# Patient Record
Sex: MALE | Race: WHITE | NOT HISPANIC OR LATINO | Employment: FULL TIME | URBAN - NONMETROPOLITAN AREA
[De-identification: names, ages, dates, MRNs, and addresses within clinical notes are randomized per-mention and may not be internally consistent; named-entity substitution may affect disease eponyms.]

---

## 2024-06-04 ENCOUNTER — OFFICE VISIT (OUTPATIENT)
Dept: FAMILY MEDICINE CLINIC | Age: 54
End: 2024-06-04
Payer: COMMERCIAL

## 2024-06-04 VITALS
DIASTOLIC BLOOD PRESSURE: 72 MMHG | BODY MASS INDEX: 29.93 KG/M2 | HEART RATE: 86 BPM | HEIGHT: 74 IN | SYSTOLIC BLOOD PRESSURE: 120 MMHG | WEIGHT: 233.2 LBS | TEMPERATURE: 97.8 F

## 2024-06-04 DIAGNOSIS — Z76.89 ENCOUNTER TO ESTABLISH CARE: Primary | ICD-10-CM

## 2024-06-04 DIAGNOSIS — M54.50 CHRONIC LOW BACK PAIN, UNSPECIFIED BACK PAIN LATERALITY, UNSPECIFIED WHETHER SCIATICA PRESENT: ICD-10-CM

## 2024-06-04 DIAGNOSIS — Z11.59 NEED FOR HEPATITIS C SCREENING TEST: ICD-10-CM

## 2024-06-04 DIAGNOSIS — E11.9 TYPE 2 DIABETES MELLITUS WITHOUT COMPLICATION, WITHOUT LONG-TERM CURRENT USE OF INSULIN: ICD-10-CM

## 2024-06-04 DIAGNOSIS — N52.9 ERECTILE DYSFUNCTION, UNSPECIFIED ERECTILE DYSFUNCTION TYPE: ICD-10-CM

## 2024-06-04 DIAGNOSIS — I10 PRIMARY HYPERTENSION: ICD-10-CM

## 2024-06-04 DIAGNOSIS — G89.29 CHRONIC LOW BACK PAIN, UNSPECIFIED BACK PAIN LATERALITY, UNSPECIFIED WHETHER SCIATICA PRESENT: ICD-10-CM

## 2024-06-04 DIAGNOSIS — Z12.11 COLON CANCER SCREENING: ICD-10-CM

## 2024-06-04 DIAGNOSIS — K21.9 GASTROESOPHAGEAL REFLUX DISEASE, UNSPECIFIED WHETHER ESOPHAGITIS PRESENT: ICD-10-CM

## 2024-06-04 PROCEDURE — 99204 OFFICE O/P NEW MOD 45 MIN: CPT | Performed by: NURSE PRACTITIONER

## 2024-06-04 RX ORDER — MELOXICAM 15 MG/1
15 TABLET ORAL DAILY
COMMUNITY
End: 2024-06-06 | Stop reason: SDUPTHER

## 2024-06-04 RX ORDER — CHLORAL HYDRATE 500 MG
1000 CAPSULE ORAL
COMMUNITY

## 2024-06-04 RX ORDER — METHOCARBAMOL 500 MG/1
500 TABLET, FILM COATED ORAL NIGHTLY
COMMUNITY
End: 2024-06-06 | Stop reason: SDUPTHER

## 2024-06-04 RX ORDER — ALOGLIPTIN 25 MG/1
1 TABLET, FILM COATED ORAL DAILY
COMMUNITY
End: 2024-06-07

## 2024-06-04 RX ORDER — OMEPRAZOLE 20 MG/1
20 CAPSULE, DELAYED RELEASE ORAL DAILY
COMMUNITY
End: 2024-06-06 | Stop reason: SDUPTHER

## 2024-06-04 RX ORDER — CETIRIZINE HYDROCHLORIDE 10 MG/1
10 TABLET ORAL DAILY
COMMUNITY

## 2024-06-04 RX ORDER — SILDENAFIL 25 MG/1
25 TABLET, FILM COATED ORAL AS NEEDED
COMMUNITY
End: 2024-06-04

## 2024-06-04 RX ORDER — LISINOPRIL 20 MG/1
20 TABLET ORAL DAILY
COMMUNITY
End: 2024-06-06 | Stop reason: SDUPTHER

## 2024-06-04 RX ORDER — MELOXICAM 7.5 MG/1
7.5 TABLET ORAL DAILY
COMMUNITY
End: 2024-06-04

## 2024-06-04 RX ORDER — FLUTICASONE PROPIONATE 50 MCG
2 SPRAY, SUSPENSION (ML) NASAL DAILY
COMMUNITY

## 2024-06-04 RX ORDER — SILDENAFIL 100 MG/1
100 TABLET, FILM COATED ORAL DAILY PRN
COMMUNITY
End: 2024-06-06 | Stop reason: SDUPTHER

## 2024-06-04 NOTE — ASSESSMENT & PLAN NOTE
He is currently taking meloxicam 15 mg daily and Robaxin 500 mg nightly.  Will check a kidney function.

## 2024-06-04 NOTE — ASSESSMENT & PLAN NOTE
He reports that his blood sugars have been running high in the morning.  I do recommend an evening snack, such as peanut butter crackers to see if that will help.  He is currently taking metformin at 1000 mg twice daily and alogliptin 25 mg daily.  He was unable to tolerate sulfonylurea due to hypoglycemia.

## 2024-06-04 NOTE — ASSESSMENT & PLAN NOTE
Hypertension is stable and controlled  Continue current treatment regimen.  Dietary sodium restriction.  Weight loss.  Regular aerobic exercise.  Blood pressure will be reassessed in 6 months.    Continue lisinopril 20 mg daily.

## 2024-06-04 NOTE — PROGRESS NOTES
"Chief Complaint  Establish Care    Subjective          Leo Sanchez presents to [facility]  History of Present Illness  He is here to establish care. He has moved from [facility].     DM2: He is taking metformin at 1000 mg twice daily and alogliptin 25 mg daily. He reports that his BG in the morning have been running 200s. He was on gliperide, but it was hypoglycemia with it and had to stop. His last A1c was checked in November and it was 6.7. He was on a statin in the past and he would have myalgia. He is taking fish oil and metamucil.  He has been losing weight and trying to watch his sugar intake.  He reports that his morning blood sugars have been elevated.    HTN: He is taking lisinopril 20 mg daily. BP controlled. He denies SI/HI.    Low back pain: He is taking meloxicam 15 mg daily and Robaxin 500 mg nightly. He had a L4-5 discectomy 2019.    GERD: He is taking omeprazole 20 mg daily. He denies melena, dysphagia, and hematemesis.    ED: He is taking sildenafil 100 mg prn.    He's . His wife is undergoing BCA treatment.  He has 1 child.  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      Objective   Vital Signs:   /72 (BP Location: Left arm, Patient Position: Sitting, Cuff Size: Large Adult)   Pulse 86   Temp 97.8 °F (36.6 °C) (Oral)   Ht 188 cm (74\")   Wt 106 kg (233 lb 3.2 oz)   BMI 29.94 kg/m²     Physical Exam  Constitutional:       General: He is not in acute distress.     Appearance: Normal appearance. He is normal weight.   HENT:      Head: Normocephalic.   Eyes:      Pupils: Pupils are equal, round, and reactive to light.      Visual Fields: Right eye visual fields normal and left eye visual fields normal.   Neck:      Trachea: Trachea normal.   Cardiovascular:      Rate and Rhythm: Normal rate and regular rhythm.      Heart sounds: Normal heart sounds.   Pulmonary:      Effort: Pulmonary effort is normal.      Breath sounds: Normal breath sounds and air " entry.   Musculoskeletal:      Right lower leg: No edema.      Left lower leg: No edema.   Skin:     General: Skin is warm and dry.   Neurological:      Mental Status: He is alert and oriented to person, place, and time.   Psychiatric:         Mood and Affect: Mood and affect normal.         Behavior: Behavior normal.         Thought Content: Thought content normal.        Result Review :   The following data was reviewed by: MAKENNA Jung on 06/04/2024:                  Assessment and Plan    Diagnoses and all orders for this visit:    1. Encounter to establish care (Primary)    2. Type 2 diabetes mellitus without complication, without long-term current use of insulin  Assessment & Plan:  He reports that his blood sugars have been running high in the morning.  I do recommend an evening snack, such as peanut butter crackers to see if that will help.  He is currently taking metformin at 1000 mg twice daily and alogliptin 25 mg daily.  He was unable to tolerate sulfonylurea due to hypoglycemia.    Orders:  -     CBC (No Diff); Future  -     Comprehensive Metabolic Panel; Future  -     Lipid Panel; Future  -     Hemoglobin A1c; Future    3. Need for hepatitis C screening test  -     Hepatitis C Antibody; Future    4. Colon cancer screening  -     Cologuard - Stool, Per Rectum; Future    5. Primary hypertension  Assessment & Plan:  Hypertension is stable and controlled  Continue current treatment regimen.  Dietary sodium restriction.  Weight loss.  Regular aerobic exercise.  Blood pressure will be reassessed in 6 months.    Continue lisinopril 20 mg daily.      6. Chronic low back pain, unspecified back pain laterality, unspecified whether sciatica present  Assessment & Plan:  He is currently taking meloxicam 15 mg daily and Robaxin 500 mg nightly.  Will check a kidney function.      7. Erectile dysfunction, unspecified erectile dysfunction type  Assessment & Plan:  Will take Viagra 100 mg as needed      8.  Gastroesophageal reflux disease, unspecified whether esophagitis present  Assessment & Plan:  He is currently taking omeprazole 20 mg daily.            Follow Up   Return in about 6 months (around 12/4/2024) for Annual physical.  Patient was given instructions and counseling regarding his condition or for health maintenance advice. Please see specific information pulled into the AVS if appropriate.

## 2024-06-05 ENCOUNTER — LAB (OUTPATIENT)
Dept: LAB | Facility: HOSPITAL | Age: 54
End: 2024-06-05
Payer: COMMERCIAL

## 2024-06-05 DIAGNOSIS — Z11.59 NEED FOR HEPATITIS C SCREENING TEST: ICD-10-CM

## 2024-06-05 DIAGNOSIS — E11.9 TYPE 2 DIABETES MELLITUS WITHOUT COMPLICATION, WITHOUT LONG-TERM CURRENT USE OF INSULIN: ICD-10-CM

## 2024-06-05 LAB
ALBUMIN SERPL-MCNC: 4.6 G/DL (ref 3.5–5.2)
ALBUMIN/GLOB SERPL: 1.8 G/DL
ALP SERPL-CCNC: 85 U/L (ref 39–117)
ALT SERPL W P-5'-P-CCNC: 100 U/L (ref 1–41)
ANION GAP SERPL CALCULATED.3IONS-SCNC: 12 MMOL/L (ref 5–15)
AST SERPL-CCNC: 57 U/L (ref 1–40)
BILIRUB SERPL-MCNC: 1.3 MG/DL (ref 0–1.2)
BUN SERPL-MCNC: 17 MG/DL (ref 6–20)
BUN/CREAT SERPL: 19.3 (ref 7–25)
CALCIUM SPEC-SCNC: 9.3 MG/DL (ref 8.6–10.5)
CHLORIDE SERPL-SCNC: 100 MMOL/L (ref 98–107)
CHOLEST SERPL-MCNC: 199 MG/DL (ref 0–200)
CO2 SERPL-SCNC: 25 MMOL/L (ref 22–29)
CREAT SERPL-MCNC: 0.88 MG/DL (ref 0.76–1.27)
DEPRECATED RDW RBC AUTO: 45.4 FL (ref 37–54)
EGFRCR SERPLBLD CKD-EPI 2021: 101.6 ML/MIN/1.73
ERYTHROCYTE [DISTWIDTH] IN BLOOD BY AUTOMATED COUNT: 13.1 % (ref 12.3–15.4)
GLOBULIN UR ELPH-MCNC: 2.6 GM/DL
GLUCOSE SERPL-MCNC: 194 MG/DL (ref 65–99)
HBA1C MFR BLD: 8.3 % (ref 4.8–5.6)
HCT VFR BLD AUTO: 44.5 % (ref 37.5–51)
HCV AB SER QL: NORMAL
HDLC SERPL-MCNC: 33 MG/DL (ref 40–60)
HGB BLD-MCNC: 15.6 G/DL (ref 13–17.7)
LDLC SERPL CALC-MCNC: 87 MG/DL (ref 0–100)
LDLC/HDLC SERPL: 2.08 {RATIO}
MCH RBC QN AUTO: 32.9 PG (ref 26.6–33)
MCHC RBC AUTO-ENTMCNC: 35.1 G/DL (ref 31.5–35.7)
MCV RBC AUTO: 93.9 FL (ref 79–97)
PLATELET # BLD AUTO: 241 10*3/MM3 (ref 140–450)
PMV BLD AUTO: 11.4 FL (ref 6–12)
POTASSIUM SERPL-SCNC: 4.6 MMOL/L (ref 3.5–5.2)
PROT SERPL-MCNC: 7.2 G/DL (ref 6–8.5)
RBC # BLD AUTO: 4.74 10*6/MM3 (ref 4.14–5.8)
SODIUM SERPL-SCNC: 137 MMOL/L (ref 136–145)
TRIGL SERPL-MCNC: 487 MG/DL (ref 0–150)
VLDLC SERPL-MCNC: 79 MG/DL (ref 5–40)
WBC NRBC COR # BLD AUTO: 6.8 10*3/MM3 (ref 3.4–10.8)

## 2024-06-05 PROCEDURE — 86803 HEPATITIS C AB TEST: CPT

## 2024-06-05 PROCEDURE — 85027 COMPLETE CBC AUTOMATED: CPT

## 2024-06-05 PROCEDURE — 80061 LIPID PANEL: CPT

## 2024-06-05 PROCEDURE — 83036 HEMOGLOBIN GLYCOSYLATED A1C: CPT

## 2024-06-05 PROCEDURE — 36415 COLL VENOUS BLD VENIPUNCTURE: CPT

## 2024-06-05 PROCEDURE — 80053 COMPREHEN METABOLIC PANEL: CPT

## 2024-06-06 DIAGNOSIS — K21.9 GASTROESOPHAGEAL REFLUX DISEASE, UNSPECIFIED WHETHER ESOPHAGITIS PRESENT: ICD-10-CM

## 2024-06-06 DIAGNOSIS — N52.9 ERECTILE DYSFUNCTION, UNSPECIFIED ERECTILE DYSFUNCTION TYPE: ICD-10-CM

## 2024-06-06 DIAGNOSIS — E11.65 TYPE 2 DIABETES MELLITUS WITH HYPERGLYCEMIA, WITHOUT LONG-TERM CURRENT USE OF INSULIN: Primary | ICD-10-CM

## 2024-06-06 DIAGNOSIS — M54.50 CHRONIC LOW BACK PAIN, UNSPECIFIED BACK PAIN LATERALITY, UNSPECIFIED WHETHER SCIATICA PRESENT: ICD-10-CM

## 2024-06-06 DIAGNOSIS — G89.29 CHRONIC LOW BACK PAIN, UNSPECIFIED BACK PAIN LATERALITY, UNSPECIFIED WHETHER SCIATICA PRESENT: ICD-10-CM

## 2024-06-06 DIAGNOSIS — I10 PRIMARY HYPERTENSION: ICD-10-CM

## 2024-06-07 ENCOUNTER — PATIENT ROUNDING (BHMG ONLY) (OUTPATIENT)
Dept: FAMILY MEDICINE CLINIC | Age: 54
End: 2024-06-07
Payer: COMMERCIAL

## 2024-06-07 DIAGNOSIS — R79.89 ELEVATED LFTS: Primary | ICD-10-CM

## 2024-06-07 RX ORDER — MELOXICAM 15 MG/1
15 TABLET ORAL DAILY
Qty: 90 TABLET | Refills: 1 | Status: SHIPPED | OUTPATIENT
Start: 2024-06-07

## 2024-06-07 RX ORDER — ALOGLIPTIN 25 MG/1
25 TABLET, FILM COATED ORAL DAILY
Qty: 30 TABLET | Status: CANCELLED | OUTPATIENT
Start: 2024-06-07

## 2024-06-07 RX ORDER — SILDENAFIL 100 MG/1
100 TABLET, FILM COATED ORAL DAILY PRN
Qty: 30 TABLET | Refills: 5 | Status: SHIPPED | OUTPATIENT
Start: 2024-06-07

## 2024-06-07 RX ORDER — OMEPRAZOLE 20 MG/1
20 CAPSULE, DELAYED RELEASE ORAL DAILY
Qty: 90 CAPSULE | Refills: 1 | Status: SHIPPED | OUTPATIENT
Start: 2024-06-07

## 2024-06-07 RX ORDER — LISINOPRIL 20 MG/1
20 TABLET ORAL DAILY
Qty: 90 TABLET | Refills: 1 | Status: SHIPPED | OUTPATIENT
Start: 2024-06-07

## 2024-06-07 RX ORDER — METHOCARBAMOL 500 MG/1
500 TABLET, FILM COATED ORAL NIGHTLY
Qty: 90 TABLET | Refills: 1 | Status: SHIPPED | OUTPATIENT
Start: 2024-06-07

## 2024-06-07 NOTE — TELEPHONE ENCOUNTER
Rx Refill Note  Requested Prescriptions     Pending Prescriptions Disp Refills    metFORMIN (GLUCOPHAGE) 1000 MG tablet       Sig: Take 1 tablet by mouth 2 (Two) Times a Day With Meals.    Alogliptin Benzoate 25 MG tablet 30 tablet      Sig: Take 1 tablet by mouth Daily.    lisinopril (PRINIVIL,ZESTRIL) 20 MG tablet       Sig: Take 1 tablet by mouth Daily.    omeprazole (priLOSEC) 20 MG capsule       Sig: Take 1 capsule by mouth Daily.    methocarbamol (ROBAXIN) 500 MG tablet       Sig: Take 1 tablet by mouth Every Night.    sildenafil (VIAGRA) 100 MG tablet       Sig: Take 1 tablet by mouth Daily As Needed for Erectile Dysfunction.    meloxicam (MOBIC) 15 MG tablet       Sig: Take 1 tablet by mouth Daily.      Last office visit with prescribing clinician: 6/4/2024   Last telemedicine visit with prescribing clinician: Visit date not found   Next office visit with prescribing clinician: 12/4/2024       Pinky Agrawal LPN  06/07/24, 08:57 EDT    NOT FILLED BY YOU PREVIOUSLY, PLEASE ADV.

## 2024-06-07 NOTE — PROGRESS NOTES
June 7, 2024    Hello, may I speak with Leo Sanchez?    My name is AJ     I am  with [facility]  [address] [facility] [address]  [place] [id number] [phone].    Before we get started may I verify your date of birth? 1970    I am calling to officially welcome you to our practice and ask about your recent visit. Is this a good time to talk? YES    Tell me about your visit with us. What things went well?  Everything was good. Very professional. Clarissa answered my questions. Good physician.       We're always looking for ways to make our patients' experiences even better. Do you have recommendations on ways we may improve?  NO    Overall were you satisfied with your first visit to our practice? YES       I appreciate you taking the time to speak with me today. Is there anything else I can do for you? NO      Thank you, and have a great day.

## 2024-06-07 NOTE — TELEPHONE ENCOUNTER
I would like to switch his alogliptin to Ozempic or Trulicity, as I think he would have a greater improvement in his A1c.  Please let me know if he is agreeable.

## 2024-06-10 ENCOUNTER — PRIOR AUTHORIZATION (OUTPATIENT)
Dept: FAMILY MEDICINE CLINIC | Age: 54
End: 2024-06-10
Payer: COMMERCIAL

## 2024-07-16 ENCOUNTER — TELEPHONE (OUTPATIENT)
Dept: GASTROENTEROLOGY | Facility: CLINIC | Age: 54
End: 2024-07-16
Payer: COMMERCIAL

## 2024-07-17 DIAGNOSIS — E11.65 TYPE 2 DIABETES MELLITUS WITH HYPERGLYCEMIA, WITHOUT LONG-TERM CURRENT USE OF INSULIN: Primary | ICD-10-CM

## 2024-07-22 DIAGNOSIS — E11.65 TYPE 2 DIABETES MELLITUS WITH HYPERGLYCEMIA, WITHOUT LONG-TERM CURRENT USE OF INSULIN: ICD-10-CM

## 2024-07-22 RX ORDER — SEMAGLUTIDE 0.68 MG/ML
0.5 INJECTION, SOLUTION SUBCUTANEOUS WEEKLY
Qty: 3 ML | Refills: 5 | Status: SHIPPED | OUTPATIENT
Start: 2024-07-22

## 2024-07-25 NOTE — TELEPHONE ENCOUNTER
Attempted to contact the patient in regards to the new patient appointment he has scheduled for 09/30/24 with Harriett Land at the [place] location to offer a sooner appointment with our new APRN Jackie Saunders since she has sooner availability for [place]. The patient did not answer so I left a voicemail requesting a call back.

## 2024-07-30 NOTE — TELEPHONE ENCOUNTER
Attempted to contact the patient for the third time in regards to the new patient appointment he has scheduled for 09/30/24 with Harriett Land at the [place] location to offer a sooner appointment with our new APRN Jackie Saunders since she has sooner availability for [place]. The patient did not answer so I left a voicemail requesting a call back if he would like to be moved up for a sooner appointment.    This is the third time I have attempted to call the patient to offer a sooner appointment, I have removed him from the wait list since he has not answered any of the calls or called our office back.

## 2024-07-30 NOTE — TELEPHONE ENCOUNTER
Patient called the office back and verified his information then was advised on the new APRN with the practice and what her availability is for sooner appointments in [place]. Patient verbalized understanding and has been rescheduled for 08/02/24 at 3:30 pm with MAKENNA Ng.

## 2024-08-02 ENCOUNTER — OFFICE VISIT (OUTPATIENT)
Age: 54
End: 2024-08-02
Payer: COMMERCIAL

## 2024-08-02 ENCOUNTER — LAB (OUTPATIENT)
Dept: LAB | Facility: HOSPITAL | Age: 54
End: 2024-08-02
Payer: COMMERCIAL

## 2024-08-02 VITALS
HEART RATE: 88 BPM | HEIGHT: 74 IN | WEIGHT: 224 LBS | DIASTOLIC BLOOD PRESSURE: 84 MMHG | BODY MASS INDEX: 28.75 KG/M2 | SYSTOLIC BLOOD PRESSURE: 127 MMHG | TEMPERATURE: 98.4 F | OXYGEN SATURATION: 97 %

## 2024-08-02 DIAGNOSIS — R79.89 ELEVATED LIVER FUNCTION TESTS: Primary | ICD-10-CM

## 2024-08-02 DIAGNOSIS — R17 ELEVATED BILIRUBIN: ICD-10-CM

## 2024-08-02 LAB
ALPHA-FETOPROTEIN: <2 NG/ML (ref 0–8.3)
CERULOPLASMIN SERPL-MCNC: 5 MG/DL (ref 16–31)
DEPRECATED RDW RBC AUTO: 40.1 FL (ref 37–54)
ERYTHROCYTE [DISTWIDTH] IN BLOOD BY AUTOMATED COUNT: 12 % (ref 12.3–15.4)
FERRITIN SERPL-MCNC: 112.3 NG/ML (ref 30–400)
HAV IGM SERPL QL IA: NORMAL
HBV CORE IGM SERPL QL IA: NORMAL
HBV SURFACE AG SERPL QL IA: NORMAL
HCT VFR BLD AUTO: 47.8 % (ref 37.5–51)
HCV AB SER QL: NORMAL
HGB BLD-MCNC: 17.5 G/DL (ref 13–17.7)
INR PPP: 1.06 (ref 0.86–1.15)
IRON 24H UR-MRATE: 93 MCG/DL (ref 59–158)
IRON SATN MFR SERPL: 24 % (ref 20–50)
MCH RBC QN AUTO: 33.4 PG (ref 26.6–33)
MCHC RBC AUTO-ENTMCNC: 36.6 G/DL (ref 31.5–35.7)
MCV RBC AUTO: 91.2 FL (ref 79–97)
PLATELET # BLD AUTO: 264 10*3/MM3 (ref 140–450)
PMV BLD AUTO: 11.7 FL (ref 6–12)
PROTHROMBIN TIME: 14 SECONDS (ref 11.8–14.9)
RBC # BLD AUTO: 5.24 10*6/MM3 (ref 4.14–5.8)
TIBC SERPL-MCNC: 395 MCG/DL (ref 298–536)
TRANSFERRIN SERPL-MCNC: 265 MG/DL (ref 200–360)
WBC NRBC COR # BLD AUTO: 7.98 10*3/MM3 (ref 3.4–10.8)

## 2024-08-02 PROCEDURE — 82390 ASSAY OF CERULOPLASMIN: CPT

## 2024-08-02 PROCEDURE — 86038 ANTINUCLEAR ANTIBODIES: CPT

## 2024-08-02 PROCEDURE — 86334 IMMUNOFIX E-PHORESIS SERUM: CPT

## 2024-08-02 PROCEDURE — 36415 COLL VENOUS BLD VENIPUNCTURE: CPT

## 2024-08-02 PROCEDURE — 84165 PROTEIN E-PHORESIS SERUM: CPT

## 2024-08-02 PROCEDURE — 82525 ASSAY OF COPPER: CPT

## 2024-08-02 PROCEDURE — 84466 ASSAY OF TRANSFERRIN: CPT

## 2024-08-02 PROCEDURE — 85027 COMPLETE CBC AUTOMATED: CPT

## 2024-08-02 PROCEDURE — 82728 ASSAY OF FERRITIN: CPT

## 2024-08-02 PROCEDURE — 86381 MITOCHONDRIAL ANTIBODY EACH: CPT

## 2024-08-02 PROCEDURE — 82784 ASSAY IGA/IGD/IGG/IGM EACH: CPT

## 2024-08-02 PROCEDURE — 82105 ALPHA-FETOPROTEIN SERUM: CPT

## 2024-08-02 PROCEDURE — 85610 PROTHROMBIN TIME: CPT

## 2024-08-02 PROCEDURE — 80076 HEPATIC FUNCTION PANEL: CPT

## 2024-08-02 PROCEDURE — 82104 ALPHA-1-ANTITRYPSIN PHENO: CPT

## 2024-08-02 PROCEDURE — 82103 ALPHA-1-ANTITRYPSIN TOTAL: CPT

## 2024-08-02 PROCEDURE — 80074 ACUTE HEPATITIS PANEL: CPT

## 2024-08-02 PROCEDURE — 84155 ASSAY OF PROTEIN SERUM: CPT

## 2024-08-02 PROCEDURE — 86015 ACTIN ANTIBODY EACH: CPT

## 2024-08-02 PROCEDURE — 83540 ASSAY OF IRON: CPT

## 2024-08-02 NOTE — PROGRESS NOTES
Chief Complaint     elevated LFT    History of Present Illness     Leo Sanchez is a 53 y.o. male who presents to [facility] on referral from Kristie Kerr for a gastroenterology evaluation of elevated liver function tests.      Patient states the first time he experienced elevated liver function tests was when he tried to take statins for hyperlipidemia. He recently had started to take statins once again but experienced severe body cramping. Patient states during this time his creatinine kinase was greater than 5,000. He explains once he discontinues the statins his LFTs will usually improve. Since he cannot take statins, he started taking fish oils.     Patient has a long history of Back problems and back pain for which he takes meloxicam everyday. He notes that since March 1st, he has lost 20 lbs, through diet and the start of Ozempic. He reports elevated blood sugar levels, for which his PCP started him on Jardiance, one week ago. He denies nausea, vomiting, or dysphagia. States his heartburn is controlled with omeprazole. Reports occasional use of alcohol, one beer, twice a month.    Denies RUQ pain, IV drug use, unprofessional tattoos, history of or exposure to hepatitis, history of blood transfusions, and family history of liver disease.     Patient has a denies family history of colon cancer or colon polyps. No history of EGD/Colonoscopy, but Cologuard July 2024 negative.     History      Past Medical History:   Diagnosis Date    Diabetes mellitus 11/28/2012    Erectile dysfunction 2020    Fatty liver     HL (hearing loss) 2020    Hyperlipidemia     Hypertension     Low back pain        Past Surgical History:   Procedure Laterality Date    LUMBAR DISCECTOMY      10/2019    SHOULDER ARTHROSCOPY W/ LABRAL REPAIR Left 08/29/2023    VASECTOMY  2000       Family History   Problem Relation Age of Onset    Stroke Mother     Diabetes Father     Prostate cancer Father   "   Prostate cancer Brother     Diabetes Brother     Colon cancer Neg Hx         Current Medications        Current Outpatient Medications:     cetirizine (zyrTEC) 10 MG tablet, Take 1 tablet by mouth Daily., Disp: , Rfl:     fluticasone (FLONASE) 50 MCG/ACT nasal spray, 2 sprays into the nostril(s) as directed by provider Daily., Disp: , Rfl:     lisinopril (PRINIVIL,ZESTRIL) 20 MG tablet, Take 1 tablet by mouth Daily., Disp: 90 tablet, Rfl: 1    meloxicam (MOBIC) 15 MG tablet, Take 1 tablet by mouth Daily., Disp: 90 tablet, Rfl: 1    metFORMIN (GLUCOPHAGE) 1000 MG tablet, Take 1 tablet by mouth 2 (Two) Times a Day With Meals., Disp: 180 tablet, Rfl: 1    methocarbamol (ROBAXIN) 500 MG tablet, Take 1 tablet by mouth Every Night., Disp: 90 tablet, Rfl: 1    Omega-3 Fatty Acids (fish oil) 1000 MG capsule capsule, Take 1 capsule by mouth Daily With Breakfast., Disp: , Rfl:     omeprazole (priLOSEC) 20 MG capsule, Take 1 capsule by mouth Daily., Disp: 90 capsule, Rfl: 1    Semaglutide,0.25 or 0.5MG/DOS, (Ozempic, 0.25 or 0.5 MG/DOSE,) 2 MG/3ML solution pen-injector, Inject 0.5 mg under the skin into the appropriate area as directed 1 (One) Time Per Week., Disp: 3 mL, Rfl: 5    sildenafil (VIAGRA) 100 MG tablet, Take 1 tablet by mouth Daily As Needed for Erectile Dysfunction., Disp: 30 tablet, Rfl: 5     Allergies     Allergies   Allergen Reactions    Statins Other (See Comments)     Muscle cramps       Social History       Social History     Social History Narrative    , 1 child. Probation and parole       Immunizations     Immunization:  Immunization History   Administered Date(s) Administered    Fluzone (or Fluarix & Flulaval for VFC) >6mos 01/05/2024    Tdap 01/05/2024          Objective     Objective     Vital Signs:   /84   Pulse 88   Temp 98.4 °F (36.9 °C) (Oral)   Ht 188 cm (74\")   Wt 102 kg (224 lb)   SpO2 97%   BMI 28.76 kg/m²       Physical Exam  HENT:      Head: Normocephalic.   Pulmonary: " "     Effort: Pulmonary effort is normal.   Abdominal:      General: Abdomen is flat. Bowel sounds are normal.      Palpations: Abdomen is soft.   Skin:     General: Skin is warm and dry.   Neurological:      General: No focal deficit present.      Mental Status: He is alert.   Psychiatric:         Mood and Affect: Mood normal.         Results      Result Review :   The following data was reviewed by: MAKENNA Ng on 08/02/2024:    Lab Results - Last 18 Months   Lab Units 06/05/24  0716   WBC 10*3/mm3 6.80   HEMOGLOBIN g/dL 15.6   MCV fL 93.9   PLATELETS 10*3/mm3 241         Lab Results - Last 18 Months   Lab Units 06/05/24  0716   BUN mg/dL 17   CREATININE mg/dL 0.88   SODIUM mmol/L 137   POTASSIUM mmol/L 4.6   CHLORIDE mmol/L 100   CO2 mmol/L 25.0   GLUCOSE mg/dL 194*      No results for input(s): \"PROTIME\", \"INR\", \"PTT\" in the last 26923 hours.  Lab Results - Last 18 Months   Lab Units 06/05/24  0716   AST (SGOT) U/L 57*   ALT (SGPT) U/L 100*   ALK PHOS U/L 85   BILIRUBIN mg/dL 1.3*   TOTAL PROTEIN g/dL 7.2   ALBUMIN g/dL 4.6      No results for input(s): \"IRON\", \"TRANSFERRIN\", \"TIBC\", \"FERRITIN\", \"SOSKVOZD53\", \"FOLATE\" in the last 60359 hours.  No results for input(s): \"HAV\", \"HEPAIGM\", \"HEPBIGM\", \"HEPBCAB\", \"HBEAG\", \"HEPCAB\" in the last 31717 hours.    No Images in the past 120 days found..    [facility]        Assessment and Plan        Assessment and Plan    Diagnoses and all orders for this visit:    1. Elevated liver function tests (Primary)  -     AFP Tumor Marker  -     Alpha - 1 - Antitrypsin Phenotype  -     ADELIA  -     Hepatic Function Panel  -     Hepatitis Panel, Acute  -     Iron Profile  -     Ferritin  -     Copper, Serum  -     Protime-INR  -     Ceruloplasmin  -     Mitochondrial Antibodies, M2  -     Anti-Smooth Muscle Antibody Titer  -     PRIYA + PE  -     CBC (No Diff)    2. Elevated bilirubin  -     AFP Tumor Marker  -     Alpha - 1 - Antitrypsin " Phenotype  -     ADELIA  -     Hepatic Function Panel  -     Hepatitis Panel, Acute  -     Iron Profile  -     Ferritin  -     Copper, Serum  -     Protime-INR  -     Ceruloplasmin  -     Mitochondrial Antibodies, M2  -     Anti-Smooth Muscle Antibody Titer  -     PRIYA + PE  -     CBC (No Diff)        Follow Up        Follow Up   Return in about 6 months (around 2/2/2025).    Hepatic workup and liver ultrasound  Advise patient to maintain a healthy lifestyle: weight loss of 10%, cutting back on carbs, sugars, and fried fatty foods, limiting intake of soda and sugary drinks, and abstain from alcohol.  Recommend to drink 2-4 cups of coffee.     Patient was given instructions and counseling regarding his condition or for health maintenance advice. Please see specific information pulled into the AVS if appropriate.

## 2024-08-02 NOTE — PATIENT INSTRUCTIONS
Advise patient to maintain a healthy lifestyle: weight loss of 10%, cutting back on carbs, sugars, and fried fatty foods, limiting intake of soda and sugary drinks, and abstain from alcohol.  Recommend to drink 2-4 cups of coffee.

## 2024-08-03 LAB
ALBUMIN SERPL-MCNC: 4.6 G/DL (ref 3.5–5.2)
ALP SERPL-CCNC: 114 U/L (ref 39–117)
ALT SERPL W P-5'-P-CCNC: 77 U/L (ref 1–41)
AST SERPL-CCNC: 47 U/L (ref 1–40)
BILIRUB CONJ SERPL-MCNC: <0.2 MG/DL (ref 0–0.3)
BILIRUB INDIRECT SERPL-MCNC: ABNORMAL MG/DL
BILIRUB SERPL-MCNC: 0.5 MG/DL (ref 0–1.2)
PROT SERPL-MCNC: 7.3 G/DL (ref 6–8.5)

## 2024-08-05 LAB
ALBUMIN SERPL ELPH-MCNC: 4.1 G/DL (ref 2.9–4.4)
ALBUMIN/GLOB SERPL: 1.4 {RATIO} (ref 0.7–1.7)
ALPHA1 GLOB SERPL ELPH-MCNC: 0.1 G/DL (ref 0–0.4)
ALPHA2 GLOB SERPL ELPH-MCNC: 1 G/DL (ref 0.4–1)
ANA SER QL: NEGATIVE
B-GLOBULIN SERPL ELPH-MCNC: 1.2 G/DL (ref 0.7–1.3)
GAMMA GLOB SERPL ELPH-MCNC: 0.7 G/DL (ref 0.4–1.8)
GLOBULIN SER-MCNC: 3.1 G/DL (ref 2.2–3.9)
IGA SERPL-MCNC: 346 MG/DL (ref 90–386)
IGG SERPL-MCNC: 825 MG/DL (ref 603–1613)
IGM SERPL-MCNC: 48 MG/DL (ref 20–172)
INTERPRETATION SERPL IEP-IMP: NORMAL
LABORATORY COMMENT REPORT: NORMAL
M PROTEIN SERPL ELPH-MCNC: NORMAL G/DL
MITOCHONDRIA M2 IGG SER-ACNC: <20 UNITS (ref 0–20)
PROT SERPL-MCNC: 7.2 G/DL (ref 6–8.5)
SMA IGG SER-ACNC: 5 UNITS (ref 0–19)

## 2024-08-06 LAB — COPPER SERPL-MCNC: 77 UG/DL (ref 69–132)

## 2024-08-07 LAB
A1AT PHENOTYP SERPL IFE: ABNORMAL
A1AT SERPL-MCNC: 91 MG/DL (ref 101–187)

## 2024-08-08 DIAGNOSIS — E88.01 ALPHA-1-ANTITRYPSIN DEFICIENCY: Primary | ICD-10-CM

## 2024-08-09 ENCOUNTER — TELEPHONE (OUTPATIENT)
Dept: GASTROENTEROLOGY | Facility: CLINIC | Age: 54
End: 2024-08-09
Payer: COMMERCIAL

## 2024-08-09 NOTE — TELEPHONE ENCOUNTER
----- Message from Jackie Saunders sent at 8/8/2024  9:01 PM EDT -----  Alpha 1 antitrypsin levels are low. This can be indicative of alpha 1 antitrypsin deficiency. An inheritied disorder that may cause tissue damage in the lungs, liver, and skin. Adults with alpha-1 antitrypsin deficiency develop liver damage due to formation of scar tissue in the liver.  Placed orders for pulmonary consultation to verify diagnosis and course of treatment.     The ALT and AST remain elevated but trending down. On the CBC MCH, MCHC, and RDW all slightly elevated at 33.4, 36.6, and 12 respectively.      The rest of the Hepatic workup is within normal limits.

## 2024-08-09 NOTE — TELEPHONE ENCOUNTER
Spoke to patient and informed of MAKENNA Ng result note and recommendations. Verified patient understanding.    Patient is agreeable to pulmonology referral.  Advised patient to expect a call from them for scheduling.    [facility] states [facility] and [facility] Liver results.  States that once received he will scan into [facility] [employer].    Confirmed follow up appointment with Jackie Saunders APR on 02.07.25 at 1500.    Patient does not report any GI issues or concerns at this time. Educated to contact the office if any arise.

## 2024-08-19 ENCOUNTER — TELEPHONE (OUTPATIENT)
Dept: GASTROENTEROLOGY | Facility: CLINIC | Age: 54
End: 2024-08-19
Payer: COMMERCIAL

## 2024-08-20 NOTE — TELEPHONE ENCOUNTER
"----- Message from Elsa AGUIRRE sent at 8/19/2024  8:23 AM EDT -----  Regarding: FW: Liver Ultrasound  Contact: [phone]    ----- Message -----  From: Leo Sanchez \"Pat\"  Sent: 8/19/2024   6:37 AM EDT  To: [facility] [place] Clinical Pool  Subject: Liver Ultrasound                                 [facility].  "

## 2024-08-26 NOTE — PROGRESS NOTES
"Chief Complaint  Blood Sugar Problem (Pt states that in April/May his sugar numbers were running in the 300's./Pt blood sugar was 127 this morning./)    Subjective          Leo Sanchez presents to [facility]  History of Present Illness  He returns for follow-up.    DM2: His blood sugars have been running high at home.  He was taking off his alogliptin and placed on Ozempic.  He is unable to take sulfonylureas. He was [facility] on Jardiance [facility], which has helped with his BG.  He does have an appointment with the diabetic nurse practitioner next month. He has an appt for a diabetic eye exam in October with Dr. Fernandes.     HTN: He is taking lisinopril 20 mg daily. BP controlled.      Elevated LFTs: He was referred to gastro and found that he may have an alpha 1 antitrypsin deficiency.  He has been referred to pulmonology.      Objective   Vital Signs:   /75 (BP Location: Left arm, Patient Position: Sitting)   Pulse 81   Ht 188 cm (74\")   Wt 101 kg (223 lb 3.2 oz)   BMI 28.66 kg/m²     Physical Exam  Constitutional:       General: He is not in acute distress.     Appearance: Normal appearance. He is normal weight.   HENT:      Head: Normocephalic.   Eyes:      Pupils: Pupils are equal, round, and reactive to light.      Visual Fields: Right eye visual fields normal and left eye visual fields normal.   Neck:      Trachea: Trachea normal.   Cardiovascular:      Rate and Rhythm: Normal rate and regular rhythm.      Pulses:           Dorsalis pedis pulses are 3+ on the right side and 3+ on the left side.        Posterior tibial pulses are 3+ on the right side and 3+ on the left side.      Heart sounds: Normal heart sounds.   Pulmonary:      Effort: Pulmonary effort is normal.      Breath sounds: Normal breath sounds and air entry.   Musculoskeletal:      Right lower leg: No edema.      Left lower leg: No edema.   Feet:      Right foot:      Protective Sensation: 6 " sites tested.  6 sites sensed.      Skin integrity: Skin integrity normal.      Toenail Condition: Right toenails are ingrown.      Left foot:      Protective Sensation: 6 sites tested.  6 sites sensed.      Skin integrity: Skin integrity normal.      Toenail Condition: Left toenails are ingrown.   Skin:     General: Skin is warm and dry.   Neurological:      Mental Status: He is alert and oriented to person, place, and time.   Psychiatric:         Mood and Affect: Mood and affect normal.         Behavior: Behavior normal.         Thought Content: Thought content normal.        Result Review :   The following data was reviewed by: MAKENNA Jung on 09/09/2024:                  Assessment and Plan    Diagnoses and all orders for this visit:    1. Type 2 diabetes mellitus without complication, without long-term current use of insulin (Primary)  Assessment & Plan:  He is currently taking Ozempic 0.5 mg once weekly, metformin 1000 mg twice daily, and Jardiance 25 mg daily.  The only side effect he is having is nausea associated with the Ozempic.  Will send in some Zofran to see if that will help.    Diabetic Foot Exam Performed     Orders:  -     CBC (No Diff); Future  -     Comprehensive Metabolic Panel; Future  -     Lipid Panel; Future  -     Hemoglobin A1c; Future  -     Microalbumin / Creatinine Urine Ratio - Urine, Clean Catch    2. Primary hypertension  Assessment & Plan:  Hypertension is stable and controlled  Continue current treatment regimen.  Dietary sodium restriction.  Weight loss.  Regular aerobic exercise.  Blood pressure will be reassessed in 6 months.    Continue lisinopril 20 mg daily.    Orders:  -     CBC (No Diff); Future  -     Comprehensive Metabolic Panel; Future  -     Lipid Panel; Future    3. Ingrown toenail of both feet  -     Ambulatory Referral to Podiatry    4. Nausea  -     ondansetron (Zofran) 4 MG tablet; Take 1 tablet by mouth Every 8 (Eight) Hours As Needed for Nausea.   Dispense: 30 tablet; Refill: 5    5. Elevated LFTs    6. Alpha-1-antitrypsin deficiency          Follow Up   Return in about 6 months (around 3/9/2025) for Annual physical.  Patient was given instructions and counseling regarding his condition or for health maintenance advice. Please see specific information pulled into the AVS if appropriate.

## 2024-09-09 ENCOUNTER — OFFICE VISIT (OUTPATIENT)
Dept: FAMILY MEDICINE CLINIC | Age: 54
End: 2024-09-09
Payer: COMMERCIAL

## 2024-09-09 VITALS
BODY MASS INDEX: 28.64 KG/M2 | SYSTOLIC BLOOD PRESSURE: 115 MMHG | HEART RATE: 81 BPM | DIASTOLIC BLOOD PRESSURE: 75 MMHG | HEIGHT: 74 IN | WEIGHT: 223.2 LBS

## 2024-09-09 DIAGNOSIS — I10 PRIMARY HYPERTENSION: ICD-10-CM

## 2024-09-09 DIAGNOSIS — E88.01 ALPHA-1-ANTITRYPSIN DEFICIENCY: ICD-10-CM

## 2024-09-09 DIAGNOSIS — R11.0 NAUSEA: ICD-10-CM

## 2024-09-09 DIAGNOSIS — L60.0 INGROWN TOENAIL OF BOTH FEET: ICD-10-CM

## 2024-09-09 DIAGNOSIS — R79.89 ELEVATED LFTS: ICD-10-CM

## 2024-09-09 DIAGNOSIS — E11.9 TYPE 2 DIABETES MELLITUS WITHOUT COMPLICATION, WITHOUT LONG-TERM CURRENT USE OF INSULIN: Primary | ICD-10-CM

## 2024-09-09 PROCEDURE — 99214 OFFICE O/P EST MOD 30 MIN: CPT | Performed by: NURSE PRACTITIONER

## 2024-09-09 RX ORDER — ONDANSETRON 4 MG/1
4 TABLET, FILM COATED ORAL EVERY 8 HOURS PRN
Qty: 30 TABLET | Refills: 5 | Status: SHIPPED | OUTPATIENT
Start: 2024-09-09

## 2024-09-09 NOTE — ASSESSMENT & PLAN NOTE
He is currently taking Ozempic 0.5 mg once weekly, metformin 1000 mg twice daily, and Jardiance 25 mg daily.  The only side effect he is having is nausea associated with the Ozempic.  Will send in some Zofran to see if that will help.    Diabetic Foot Exam Performed

## 2024-09-16 ENCOUNTER — LAB (OUTPATIENT)
Dept: LAB | Facility: HOSPITAL | Age: 54
End: 2024-09-16
Payer: COMMERCIAL

## 2024-09-16 DIAGNOSIS — I10 PRIMARY HYPERTENSION: ICD-10-CM

## 2024-09-16 DIAGNOSIS — E11.9 TYPE 2 DIABETES MELLITUS WITHOUT COMPLICATION, WITHOUT LONG-TERM CURRENT USE OF INSULIN: ICD-10-CM

## 2024-09-16 LAB
ALBUMIN SERPL-MCNC: 4.5 G/DL (ref 3.5–5.2)
ALBUMIN UR-MCNC: <1.2 MG/DL
ALBUMIN/GLOB SERPL: 1.9 G/DL
ALP SERPL-CCNC: 71 U/L (ref 39–117)
ALT SERPL W P-5'-P-CCNC: 88 U/L (ref 1–41)
ANION GAP SERPL CALCULATED.3IONS-SCNC: 9.6 MMOL/L (ref 5–15)
AST SERPL-CCNC: 42 U/L (ref 1–40)
BILIRUB SERPL-MCNC: 0.7 MG/DL (ref 0–1.2)
BUN SERPL-MCNC: 15 MG/DL (ref 6–20)
BUN/CREAT SERPL: 16.1 (ref 7–25)
CALCIUM SPEC-SCNC: 9.7 MG/DL (ref 8.6–10.5)
CHLORIDE SERPL-SCNC: 102 MMOL/L (ref 98–107)
CHOLEST SERPL-MCNC: 209 MG/DL (ref 0–200)
CO2 SERPL-SCNC: 24.4 MMOL/L (ref 22–29)
CREAT SERPL-MCNC: 0.93 MG/DL (ref 0.76–1.27)
CREAT UR-MCNC: 122.8 MG/DL
DEPRECATED RDW RBC AUTO: 44.6 FL (ref 37–54)
EGFRCR SERPLBLD CKD-EPI 2021: 96.4 ML/MIN/1.73
ERYTHROCYTE [DISTWIDTH] IN BLOOD BY AUTOMATED COUNT: 12.7 % (ref 12.3–15.4)
GLOBULIN UR ELPH-MCNC: 2.4 GM/DL
GLUCOSE SERPL-MCNC: 125 MG/DL (ref 65–99)
HBA1C MFR BLD: 7.3 % (ref 4.8–5.6)
HCT VFR BLD AUTO: 46.6 % (ref 37.5–51)
HDLC SERPL-MCNC: 31 MG/DL (ref 40–60)
HGB BLD-MCNC: 16 G/DL (ref 13–17.7)
LDLC SERPL CALC-MCNC: 116 MG/DL (ref 0–100)
LDLC/HDLC SERPL: 3.46 {RATIO}
MCH RBC QN AUTO: 32.5 PG (ref 26.6–33)
MCHC RBC AUTO-ENTMCNC: 34.3 G/DL (ref 31.5–35.7)
MCV RBC AUTO: 94.7 FL (ref 79–97)
MICROALBUMIN/CREAT UR: NORMAL MG/G{CREAT}
PLATELET # BLD AUTO: 233 10*3/MM3 (ref 140–450)
PMV BLD AUTO: 11 FL (ref 6–12)
POTASSIUM SERPL-SCNC: 4.4 MMOL/L (ref 3.5–5.2)
PROT SERPL-MCNC: 6.9 G/DL (ref 6–8.5)
RBC # BLD AUTO: 4.92 10*6/MM3 (ref 4.14–5.8)
SODIUM SERPL-SCNC: 136 MMOL/L (ref 136–145)
TRIGL SERPL-MCNC: 353 MG/DL (ref 0–150)
VLDLC SERPL-MCNC: 62 MG/DL (ref 5–40)
WBC NRBC COR # BLD AUTO: 6.83 10*3/MM3 (ref 3.4–10.8)

## 2024-09-16 PROCEDURE — 83036 HEMOGLOBIN GLYCOSYLATED A1C: CPT

## 2024-09-16 PROCEDURE — 36415 COLL VENOUS BLD VENIPUNCTURE: CPT

## 2024-09-16 PROCEDURE — 80061 LIPID PANEL: CPT

## 2024-09-16 PROCEDURE — 82570 ASSAY OF URINE CREATININE: CPT | Performed by: NURSE PRACTITIONER

## 2024-09-16 PROCEDURE — 85027 COMPLETE CBC AUTOMATED: CPT

## 2024-09-16 PROCEDURE — 80053 COMPREHEN METABOLIC PANEL: CPT

## 2024-09-16 PROCEDURE — 82043 UR ALBUMIN QUANTITATIVE: CPT | Performed by: NURSE PRACTITIONER

## 2024-10-01 NOTE — TELEPHONE ENCOUNTER
Passed protocol    Lov 9/9/24    Last filled historical provider     Hemoglobin A1c (09/16/2024 07:10)

## 2024-10-07 NOTE — PROGRESS NOTES
Chief Complaint  Establish Care (Pt is a 54 yr old male presenting to Diabetes Care to establish care for diabetes management./)    Referred By: Kristie Ureña*    Subjective          Leo Sanchez presents to [facility] for diabetes medication management    History of Present Illness    Visit type:  to establish care  Diabetes type:  Type 2  Age at time of dx/Year of dx/Number of years: November 2012  Family History of Diabetes: Mother, father, brother  Current diabetes status/concerns/issues: Elevated liver enzymes-possible alpha 1 antitrypsin deficiency  Other current health concerns: None  Current Diabetes symptoms:    Polyuria: No   Polydipsia: No   Polyphagia: No   Blurred vision: No   Excessive fatigue: No  Known Diabetes complications:  Neuropathy: None; Location: N/A  Renal: Normal eGFR per current labs and Microalbuminuria - NEGATIVE  Eyes: No current eye exam available in record; Location: N/A; Last Eye Exam:  Next week ; Location: Dr. Fernandes  Amputation/Wounds: None  GI: Reflux and Indigestion  Cardiovascular: Hypertension, Hyperlipidemia, Hypercholesterolemia, and Hypertriglyceridemia  ED: Patient Reported  Other: None  Hospitalizations/ED/911 secondary to DM?  No  Hypoglycemia:  None reported at this time  Hypoglycemia Symptoms:  No hypoglycemia at this time  Current Diabetes treatment:  Ozempic 0.5 mg weekly, metformin 1000 mg twice daily, Jardiance 25 mg daily  Prior diabetes treatments: Sulfonylureas-not tolerant due to severe hypoglycemia  Using ACEI or ARB: Yes, lisinopril 20 mg daily, Managed by other provider  Using Statin: No  Blood glucose device:  Meter  Blood glucose monitoring frequency:  1  Blood glucose range/average:   130s consistently  Glucose Source: Patient Reported  Dietary behavior:  Limits high carb/sweet foods, Avoids sugary drinks, Number of meals each day - 3; Number of snacks each day - fruit  Activity/Exercise:   Intentional walking  2-3 times per week  Last Foot Exam:  Per PCP, scheduled with podiatry next week  Diabetes Education Hx: None  Social Determinants of Health: None    Past Medical History:   Diagnosis Date    Diabetes mellitus 2012    Erectile dysfunction     Fatty liver     HL (hearing loss)     Hyperlipidemia     Hypertension     Low back pain      Past Surgical History:   Procedure Laterality Date    LUMBAR DISCECTOMY      10/2019    SHOULDER ARTHROSCOPY W/ LABRAL REPAIR Left 2023    VASECTOMY       Family History   Problem Relation Age of Onset    Stroke Mother     Diabetes Father     Prostate cancer Father     Prostate cancer Brother     Diabetes Brother     Colon cancer Neg Hx      Social History     Socioeconomic History    Marital status:    Tobacco Use    Smoking status: Former     Current packs/day: 0.00     Average packs/day: 1.5 packs/day for 5.3 years (8.0 ttl pk-yrs)     Types: Cigarettes     Start date: 1987     Quit date: 1993     Years since quittin.4     Passive exposure: Never    Smokeless tobacco: Never   Vaping Use    Vaping status: Never Used   Substance and Sexual Activity    Alcohol use: Yes     Alcohol/week: 2.0 standard drinks of alcohol     Types: 2 Cans of beer per week     Comment: occasionally    Drug use: Yes     Types: Marijuana     Comment: hasn't smoked    Sexual activity: Yes     Partners: Female     Birth control/protection: Tubal ligation, Vasectomy     Allergies   Allergen Reactions    Statins Other (See Comments)     Muscle cramps       Current Outpatient Medications:     cetirizine (zyrTEC) 10 MG tablet, Take 1 tablet by mouth Daily., Disp: , Rfl:     empagliflozin (Jardiance) 25 MG tablet tablet, Take 1 tablet by mouth Daily., Disp: 90 tablet, Rfl: 0    fluticasone (FLONASE) 50 MCG/ACT nasal spray, Administer 2 sprays into the nostril(s) as directed by provider Daily., Disp: , Rfl:     lisinopril (PRINIVIL,ZESTRIL) 20 MG tablet, Take 1 tablet by  "mouth Daily., Disp: 90 tablet, Rfl: 1    meloxicam (MOBIC) 15 MG tablet, Take 1 tablet by mouth Daily., Disp: 90 tablet, Rfl: 1    metFORMIN (GLUCOPHAGE) 1000 MG tablet, Take 1 tablet by mouth 2 (Two) Times a Day With Meals., Disp: 180 tablet, Rfl: 1    methocarbamol (ROBAXIN) 500 MG tablet, Take 1 tablet by mouth Every Night., Disp: 90 tablet, Rfl: 1    Omega-3 Fatty Acids (fish oil) 1000 MG capsule capsule, Take 1 capsule by mouth Daily With Breakfast., Disp: , Rfl:     omeprazole (priLOSEC) 20 MG capsule, Take 1 capsule by mouth Daily., Disp: 90 capsule, Rfl: 1    ondansetron (Zofran) 4 MG tablet, Take 1 tablet by mouth Every 8 (Eight) Hours As Needed for Nausea., Disp: 30 tablet, Rfl: 5    Semaglutide,0.25 or 0.5MG/DOS, (Ozempic, 0.25 or 0.5 MG/DOSE,) 2 MG/3ML solution pen-injector, Inject 0.5 mg under the skin into the appropriate area as directed 1 (One) Time Per Week., Disp: 3 mL, Rfl: 5    sildenafil (VIAGRA) 100 MG tablet, Take 1 tablet by mouth Daily As Needed for Erectile Dysfunction., Disp: 30 tablet, Rfl: 5    pioglitazone (Actos) 15 MG tablet, Take 1 tablet by mouth Daily, Disp: 30 tablet, Rfl: 2    Objective     Vitals:    10/08/24 1534   BP: 125/85   BP Location: Right arm   Patient Position: Sitting   Cuff Size: Adult   Pulse: 82   Temp: 98.2 °F (36.8 °C)   TempSrc: Infrared   SpO2: 97%   Weight: 104 kg (228 lb 8 oz)   Height: 188 cm (74.02\")     Body mass index is 29.32 kg/m².    Physical Exam    Result Review :   The following data was reviewed by: MAKENNA Kwon on 10/08/2024:    Most Recent A1C          10/8/2024    15:48   HGBA1C Most Recent   Hemoglobin A1C 6.6        A1C Last 3 Results          6/5/2024    07:16 9/16/2024    07:10 10/8/2024    15:48   HGBA1C Last 3 Results   Hemoglobin A1C 8.30  7.30  6.6      A1c collected in the office today is 6.6%, indicating Controlled Type II diabetes.  This result is down from the prior result of 7.3% collected on 9/16/2024    Creatinine   Date " Value Ref Range Status   09/16/2024 0.93 0.76 - 1.27 mg/dL Final   06/05/2024 0.88 0.76 - 1.27 mg/dL Final     eGFR   Date Value Ref Range Status   09/16/2024 96.4 >60.0 mL/min/1.73 Final   06/05/2024 101.6 >60.0 mL/min/1.73 Final     Labs collected on 9/16/2024 show Normal values    Microalbumin, Urine   Date Value Ref Range Status   09/16/2024 <1.2 mg/dL Final     Creatinine, Urine   Date Value Ref Range Status   09/16/2024 122.8 mg/dL Final     Microalbumin/Creatinine Ratio   Date Value Ref Range Status   09/16/2024   Final     Comment:     Unable to calculate     Urine microalbuminuria collected on 9/16/2024 is negative for microalbuminuria    Total Cholesterol   Date Value Ref Range Status   09/16/2024 209 (H) 0 - 200 mg/dL Final   06/05/2024 199 0 - 200 mg/dL Final     Triglycerides   Date Value Ref Range Status   09/16/2024 353 (H) 0 - 150 mg/dL Final   06/05/2024 487 (H) 0 - 150 mg/dL Final     HDL Cholesterol   Date Value Ref Range Status   09/16/2024 31 (L) 40 - 60 mg/dL Final   06/05/2024 33 (L) 40 - 60 mg/dL Final     LDL Cholesterol    Date Value Ref Range Status   09/16/2024 116 (H) 0 - 100 mg/dL Final   06/05/2024 87 0 - 100 mg/dL Final     Lipid panel collected on 9/16/2024 shows Hyperlipidemia, Hypercholesterolemia, Hypertriglyceridemia, and low HDL            Assessment: Patient was referred by his primary care provider for management of diabetes.  Patient currently prescribed and taking Ozempic 0.5 mg once weekly, Jardiance 25 mg daily, and metformin 1000 mg twice daily.  Patient is tolerating the medications without difficulty, although the Ozempic does cause some nausea that responds appropriately to Zofran.  Patient has had a reduction in his A1c down from 7.3% on 9/16/24 to 6.6% collected in the office today, indicative of controlled type 2 diabetes.  Patient expresses frustration with making dietary changes and increasing daily exercise while blood sugars continue to remain elevated at times,  likely from insulin resistance.  Patient is also scheduled to follow-up with pulmonary next week to rule out alpha-1 antitrypsin deficiency which is believed to be the cause of his elevated liver enzymes.      Diagnoses and all orders for this visit:    1. Type 2 diabetes mellitus with hyperglycemia, without long-term current use of insulin (Primary)  -     POC Glycosylated Hemoglobin (Hb A1C)  -     pioglitazone (Actos) 15 MG tablet; Take 1 tablet by mouth Daily  Dispense: 30 tablet; Refill: 2    2. Hemoglobin A1c less than 7.0%    3. Overweight with body mass index (BMI) of 29 to 29.9 in adult        Plan: After discussion regarding risks and benefits we will initiate Actos (pioglitazone) 15 mg once daily to assist with insulin resistance, and improve the effectiveness of his other medications.  Patient is encouraged to continue his focus on his diet and physical activity to assist with improvement in glycemic control and weight management.  Patient is scheduled for a follow-up in 3 months and an A1c will be collected.    The patient will monitor his blood glucose levels 1 time daily.  If he develops problematic hyperglycemia or hypoglycemia or adverse drug reactions, he will contact the office for further instructions.        Follow Up     No follow-ups on file.    Patient was given instructions and counseling regarding his condition or for health maintenance advice. Please see specific information pulled into the AVS if appropriate.     Nakul Robbins, APRN  10/08/2024      Dictated Utilizing Dragon Dictation.  Please note that portions of this note were completed with a voice recognition program.  Part of this note may be an electronic transcription/translation of spoken language to printed text using the Dragon Dictation System.

## 2024-10-08 ENCOUNTER — OFFICE VISIT (OUTPATIENT)
Dept: DIABETES SERVICES | Facility: CLINIC | Age: 54
End: 2024-10-08
Payer: COMMERCIAL

## 2024-10-08 VITALS
HEART RATE: 82 BPM | DIASTOLIC BLOOD PRESSURE: 85 MMHG | BODY MASS INDEX: 29.33 KG/M2 | HEIGHT: 74 IN | SYSTOLIC BLOOD PRESSURE: 125 MMHG | WEIGHT: 228.5 LBS | OXYGEN SATURATION: 97 % | TEMPERATURE: 98.2 F

## 2024-10-08 DIAGNOSIS — E11.65 TYPE 2 DIABETES MELLITUS WITH HYPERGLYCEMIA, WITHOUT LONG-TERM CURRENT USE OF INSULIN: Primary | ICD-10-CM

## 2024-10-08 DIAGNOSIS — E66.3 OVERWEIGHT WITH BODY MASS INDEX (BMI) OF 29 TO 29.9 IN ADULT: ICD-10-CM

## 2024-10-08 DIAGNOSIS — R73.09 HEMOGLOBIN A1C LESS THAN 7.0%: ICD-10-CM

## 2024-10-08 LAB
EXPIRATION DATE: ABNORMAL
HBA1C MFR BLD: 6.6 % (ref 4.5–5.7)
Lab: ABNORMAL

## 2024-10-08 RX ORDER — PIOGLITAZONEHYDROCHLORIDE 15 MG/1
15 TABLET ORAL DAILY
Qty: 30 TABLET | Refills: 2 | Status: SHIPPED | OUTPATIENT
Start: 2024-10-08 | End: 2025-01-06

## 2024-10-15 ENCOUNTER — OFFICE VISIT (OUTPATIENT)
Dept: PULMONOLOGY | Facility: CLINIC | Age: 54
End: 2024-10-15
Payer: COMMERCIAL

## 2024-10-15 VITALS
OXYGEN SATURATION: 99 % | HEART RATE: 82 BPM | SYSTOLIC BLOOD PRESSURE: 120 MMHG | DIASTOLIC BLOOD PRESSURE: 78 MMHG | BODY MASS INDEX: 29.26 KG/M2 | HEIGHT: 74 IN | RESPIRATION RATE: 18 BRPM | WEIGHT: 228 LBS | TEMPERATURE: 98 F

## 2024-10-15 DIAGNOSIS — Z14.8 ALPHA-1-ANTITRYPSIN DEFICIENCY CARRIER: ICD-10-CM

## 2024-10-15 DIAGNOSIS — F17.201 TOBACCO ABUSE, IN REMISSION: ICD-10-CM

## 2024-10-15 DIAGNOSIS — R74.01 TRANSAMINITIS: ICD-10-CM

## 2024-10-15 DIAGNOSIS — Z23 ENCOUNTER FOR IMMUNIZATION: Primary | ICD-10-CM

## 2024-10-15 NOTE — PROGRESS NOTES
Primary Care Provider  Kristie Molina APRN   Referring Provider  Jackie Saunders*      Patient Complaint  Establish Care (Alpha 1 deficiency)      Subjective          Leo Sanchez presents to [facility]      History of Presenting Illness  Leo Sanchez is a 54 y.o. male never smoker has been referred here for abnormal alpha-1 testing.  Has no evidence of COPD or emphysema.  He is asymptomatic from a respiratory perspective and denies any respiratory symptoms at this time.  Does have elevated liver enzymes.  Reports having abdominal ultrasound last year that was normal.  States he has not seen a gastroenterologist yet.  As part of his workup he had autoimmune workup for cirrhosis that was negative.  Alpha-1 testing was genotype MZ with a level of 91.  He has no family history of alpha-1 that he is aware of.  No family history of anybody with COPD or emphysema.  Also no family history of anybody with cirrhosis.  He has a remote former cigarette smoker and quit smoking 31 years ago.    Denies dyspnea, coughing, wheezing, headaches, chest pain, weight loss or hemoptysis. Denies fevers, chills and night sweats.  He is able to perform ADLs without difficulties and denies any swollen glands/lymph nodes in the head or neck.        Family History   Problem Relation Age of Onset    Stroke Mother     Diabetes Father     Prostate cancer Father     Cancer Father     Prostate cancer Brother     Diabetes Brother     Cancer Brother     Colon cancer Neg Hx         Social History     Socioeconomic History    Marital status:    Tobacco Use    Smoking status: Former     Current packs/day: 0.00     Average packs/day: 1.5 packs/day for 5.3 years (8.0 ttl pk-yrs)     Types: Cigarettes     Start date: 1987     Quit date: 1993     Years since quittin.4     Passive exposure: Never    Smokeless tobacco: Never   Vaping Use    Vaping status: Never  Used   Substance and Sexual Activity    Alcohol use: Yes     Alcohol/week: 2.0 standard drinks of alcohol     Types: 2 Cans of beer per week     Comment: occasionally    Drug use: Yes     Types: Marijuana     Comment: hasn't smoked    Sexual activity: Yes     Partners: Female     Birth control/protection: Tubal ligation, Vasectomy        Past Medical History:   Diagnosis Date    Diabetes mellitus 11/28/2012    Erectile dysfunction 2020    Fatty liver     HL (hearing loss) 2020    Hyperlipidemia     Hypertension     Low back pain         Immunization History   Administered Date(s) Administered    Fluzone  >6mos 10/15/2024    Fluzone (or Fluarix & Flulaval for VFC) >6mos 01/05/2024    Tdap 01/05/2024         Allergies   Allergen Reactions    Statins Other (See Comments)     Muscle cramps          Current Outpatient Medications:     cetirizine (zyrTEC) 10 MG tablet, Take 1 tablet by mouth Daily., Disp: , Rfl:     empagliflozin (Jardiance) 25 MG tablet tablet, Take 1 tablet by mouth Daily., Disp: 90 tablet, Rfl: 0    fluticasone (FLONASE) 50 MCG/ACT nasal spray, Administer 2 sprays into the nostril(s) as directed by provider Daily., Disp: , Rfl:     lisinopril (PRINIVIL,ZESTRIL) 20 MG tablet, Take 1 tablet by mouth Daily., Disp: 90 tablet, Rfl: 1    meloxicam (MOBIC) 15 MG tablet, Take 1 tablet by mouth Daily., Disp: 90 tablet, Rfl: 1    metFORMIN (GLUCOPHAGE) 1000 MG tablet, Take 1 tablet by mouth 2 (Two) Times a Day With Meals., Disp: 180 tablet, Rfl: 1    methocarbamol (ROBAXIN) 500 MG tablet, Take 1 tablet by mouth Every Night., Disp: 90 tablet, Rfl: 1    Omega-3 Fatty Acids (fish oil) 1000 MG capsule capsule, Take 1 capsule by mouth Daily With Breakfast., Disp: , Rfl:     omeprazole (priLOSEC) 20 MG capsule, Take 1 capsule by mouth Daily., Disp: 90 capsule, Rfl: 1    ondansetron (Zofran) 4 MG tablet, Take 1 tablet by mouth Every 8 (Eight) Hours As Needed for Nausea., Disp: 30 tablet, Rfl: 5    pioglitazone (Actos)  "15 MG tablet, Take 1 tablet by mouth Daily, Disp: 30 tablet, Rfl: 2    Semaglutide,0.25 or 0.5MG/DOS, (Ozempic, 0.25 or 0.5 MG/DOSE,) 2 MG/3ML solution pen-injector, Inject 0.5 mg under the skin into the appropriate area as directed 1 (One) Time Per Week., Disp: 3 mL, Rfl: 5    sildenafil (VIAGRA) 100 MG tablet, Take 1 tablet by mouth Daily As Needed for Erectile Dysfunction., Disp: 30 tablet, Rfl: 5         Objective     Vital Signs:   /78 (BP Location: Left arm, Patient Position: Sitting, Cuff Size: Adult)   Pulse 82   Temp 98 °F (36.7 °C) (Temporal)   Resp 18   Ht 188 cm (74\")   Wt 103 kg (228 lb)   SpO2 99% Comment: Room air  BMI 29.27 kg/m²     Physical Exam  Vital Signs Reviewed   WDWN, Alert, NAD.    HEENT:  PERRL, EOMI.  OP, nares clear, no sinus tenderness  Neck:  Supple, no JVD, no thyromegaly  Lymph: no axillary, cervical, supraclavicular lymphadenopathy noted bilaterally  Chest:  good aeration, clear to auscultation bilaterally, tympanic to percussion bilaterally, no work of breathing noted  CV: RRR, no MGR, pulses 2+, equal.  Abd:  Soft, NT, ND, + BS, no HSM  EXT:  no clubbing, no cyanosis, no edema, no joint tenderness  Neuro:  A&Ox3, CN grossly intact, no focal deficits.  Skin: No rashes or lesions noted       Result Review :   I have personally reviewed the office notes referring provider  Personally reviewed:  Liver enzymes are indeed elevated  [facility] showed increased echogenicity of the liver.  No evidence of peripheral eosinophilia or chronic hypercapnia  Rest of autoimmune workup grossly unremarkable  Alpha-1 testing showed genotype MZ with a level of 91         Assessment and Plan        * No active hospital problems. *      Impression:  Alpha-1 antitrypsin carrier/heterozygote.  Genotype MZ.  Level 91  Transaminitis.  Question fatty liver disease  Type 2 diabetes  Tobacco use of cigarettes in remission    Plan:  This patient is alpha-1 antitrypsin " carrier/heterozygote with genotype MZ with a low normal level of 91.  This is the most common form of heterozygous alpha 1 antitrypsin carrier.  This level is not low enough to cause any pulmonary disease and a remote former cigarette smoker nor does he have any pulmonary symptoms of this.  This level is also not low enough to cause any liver damage or cirrhosis.  I did discuss this with the patient.  From my perspective, there is no indication to give patient alpha-1 augmentation therapy given this level.  I did encourage him to discuss his carrier status with his children and if they are interested in being tested to see their alpha-1 status, we will be more than happy to test them.  There is no further intervention is needed from my perspective regarding this alpha-1 carrier.  Further workup for transaminitis per primary and GI.  Alpha-1 heterozygote with a level of 91 will not cause hepatocellular injury.  Smoking status: Remote former cigarette smoker.  No indication for lung cancer screening  Vaccination status: Flu shot today.  RSV when he turns 58.  Prevnar 20 when he turns 63  Medications personally reviewed.      Follow Up   Return if symptoms worsen or fail to improve.  Patient was given instructions and counseling regarding his condition or for health maintenance advice. Please see specific information pulled into the AVS if appropriate.     Electronically signed by Neri Dockery MD, 10/15/24, 8:50 AM EDT.

## 2024-10-17 ENCOUNTER — OFFICE VISIT (OUTPATIENT)
Dept: PODIATRY | Facility: CLINIC | Age: 54
End: 2024-10-17
Payer: COMMERCIAL

## 2024-10-17 VITALS
OXYGEN SATURATION: 98 % | HEART RATE: 80 BPM | DIASTOLIC BLOOD PRESSURE: 84 MMHG | WEIGHT: 226 LBS | SYSTOLIC BLOOD PRESSURE: 117 MMHG | BODY MASS INDEX: 29.02 KG/M2

## 2024-10-17 DIAGNOSIS — L60.0 INGROWN TOENAIL OF BOTH FEET: ICD-10-CM

## 2024-10-17 DIAGNOSIS — M79.673 PAIN OF FOOT, UNSPECIFIED LATERALITY: ICD-10-CM

## 2024-10-17 DIAGNOSIS — L60.0 INGROWN TOENAIL: Primary | ICD-10-CM

## 2024-10-17 PROCEDURE — 11750 EXCISION NAIL&NAIL MATRIX: CPT | Performed by: PODIATRIST

## 2024-10-17 NOTE — PROGRESS NOTES
[facility] - PODIATRY    Today's Date: 10/21/24    Patient Name: Leo Sanchez  MRN: [id number]  CSN: [id number]  PCP: Kristie Molina APRN,   Referring Provider: Kristie Molina *    SUBJECTIVE     Chief Complaint   Patient presents with    Left Foot - Establish Care, Ingrown Toenail     Referral from MAKENNA Oswald for ingrown toenails    Right Foot - Establish Care, Ingrown Toenail     Referral from MAKENNA Oswald for ingrown toenails     HPI: Leo Sanchez, a 54 y.o.male, presents to clinic.    Patient is a 54-year-old male presenting with bilateral ingrown toenails.  He has had 1 removed in the past several years ago.  Believes it is come back.  Would like to have all corners of his big toes removed.    Past Medical History:   Diagnosis Date    Diabetes mellitus 2012    Erectile dysfunction     Fatty liver     HL (hearing loss)     Hyperlipidemia     Hypertension     Low back pain      Past Surgical History:   Procedure Laterality Date    LUMBAR DISCECTOMY      10/2019    SHOULDER ARTHROSCOPY W/ LABRAL REPAIR Left 2023    VASECTOMY       Family History   Problem Relation Age of Onset    Stroke Mother     Diabetes Father     Prostate cancer Father     Cancer Father     Prostate cancer Brother     Diabetes Brother     Cancer Brother     Colon cancer Neg Hx      Social History     Socioeconomic History    Marital status:    Tobacco Use    Smoking status: Former     Current packs/day: 0.00     Average packs/day: 1.5 packs/day for 5.3 years (8.0 ttl pk-yrs)     Types: Cigarettes     Start date: 1987     Quit date: 1993     Years since quittin.4     Passive exposure: Never    Smokeless tobacco: Never   Vaping Use    Vaping status: Never Used   Substance and Sexual Activity    Alcohol use: Yes     Alcohol/week: 2.0 standard drinks of alcohol     Types: 2 Cans of beer per week     Comment: occasionally    Drug use: Yes     Types:  Marijuana     Comment: hasn't smoked    Sexual activity: Yes     Partners: Female     Birth control/protection: Tubal ligation, Vasectomy     Allergies   Allergen Reactions    Statins Other (See Comments)     Muscle cramps     Current Outpatient Medications   Medication Sig Dispense Refill    cetirizine (zyrTEC) 10 MG tablet Take 1 tablet by mouth Daily.      empagliflozin (Jardiance) 25 MG tablet tablet Take 1 tablet by mouth Daily. 90 tablet 0    fluticasone (FLONASE) 50 MCG/ACT nasal spray Administer 2 sprays into the nostril(s) as directed by provider Daily.      lisinopril (PRINIVIL,ZESTRIL) 20 MG tablet Take 1 tablet by mouth Daily. 90 tablet 1    meloxicam (MOBIC) 15 MG tablet Take 1 tablet by mouth Daily. 90 tablet 1    metFORMIN (GLUCOPHAGE) 1000 MG tablet Take 1 tablet by mouth 2 (Two) Times a Day With Meals. 180 tablet 1    methocarbamol (ROBAXIN) 500 MG tablet Take 1 tablet by mouth Every Night. 90 tablet 1    Omega-3 Fatty Acids (fish oil) 1000 MG capsule capsule Take 1 capsule by mouth Daily With Breakfast.      omeprazole (priLOSEC) 20 MG capsule Take 1 capsule by mouth Daily. 90 capsule 1    ondansetron (Zofran) 4 MG tablet Take 1 tablet by mouth Every 8 (Eight) Hours As Needed for Nausea. 30 tablet 5    pioglitazone (Actos) 15 MG tablet Take 1 tablet by mouth Daily 30 tablet 2    Semaglutide,0.25 or 0.5MG/DOS, (Ozempic, 0.25 or 0.5 MG/DOSE,) 2 MG/3ML solution pen-injector Inject 0.5 mg under the skin into the appropriate area as directed 1 (One) Time Per Week. 3 mL 5    sildenafil (VIAGRA) 100 MG tablet Take 1 tablet by mouth Daily As Needed for Erectile Dysfunction. 30 tablet 5     No current facility-administered medications for this visit.     Review of Systems   Constitutional: Negative.    Skin:         Painful Bilateral in-grown toenails   All other systems reviewed and are negative.      OBJECTIVE     Vitals:    10/17/24 1513   BP: 117/84   Pulse: 80   SpO2: 98%       WBC   Date Value Ref  Range Status   09/16/2024 6.83 3.40 - 10.80 10*3/mm3 Final     RBC   Date Value Ref Range Status   09/16/2024 4.92 4.14 - 5.80 10*6/mm3 Final     Hemoglobin   Date Value Ref Range Status   09/16/2024 16.0 13.0 - 17.7 g/dL Final     Hematocrit   Date Value Ref Range Status   09/16/2024 46.6 37.5 - 51.0 % Final     MCV   Date Value Ref Range Status   09/16/2024 94.7 79.0 - 97.0 fL Final     MCH   Date Value Ref Range Status   09/16/2024 32.5 26.6 - 33.0 pg Final     MCHC   Date Value Ref Range Status   09/16/2024 34.3 31.5 - 35.7 g/dL Final     RDW   Date Value Ref Range Status   09/16/2024 12.7 12.3 - 15.4 % Final     RDW-SD   Date Value Ref Range Status   09/16/2024 44.6 37.0 - 54.0 fl Final     MPV   Date Value Ref Range Status   09/16/2024 11.0 6.0 - 12.0 fL Final     Platelets   Date Value Ref Range Status   09/16/2024 233 140 - 450 10*3/mm3 Final         Lab Results   Component Value Date    GLUCOSE 125 (H) 09/16/2024    BUN 15 09/16/2024    CREATININE 0.93 09/16/2024    BCR 16.1 09/16/2024    K 4.4 09/16/2024    CO2 24.4 09/16/2024    CALCIUM 9.7 09/16/2024    PROTENTOTREF 7.2 08/02/2024    ALBUMIN 4.5 09/16/2024    LABIL2 1.4 08/02/2024    AST 42 (H) 09/16/2024    ALT 88 (H) 09/16/2024       Patient seen in no apparent distress.      PHYSICAL EXAM:     Foot/Ankle Exam    GENERAL  Appearance:  appears stated age  Orientation:  AAOx3  Affect:  appropriate  Gait:  unimpaired  Assistance:  independent  Right shoe gear: casual shoe  Left shoe gear: casual shoe    VASCULAR     Right Foot Vascularity   Normal vascular exam    Dorsalis pedis:  2+  Posterior tibial:  2+  Skin temperature:  warm  Edema grading:  None  CFT:  < 3 seconds  Pedal hair growth:  Present  Varicosities:  none     Left Foot Vascularity   Normal vascular exam    Dorsalis pedis:  2+  Posterior tibial:  2+  Skin temperature:  warm  Edema grading:  None  CFT:  < 3 seconds  Pedal hair growth:  Present  Varicosities:  none     NEUROLOGIC     Right  Foot Neurologic   Normal sensation    Light touch sensation: normal  Vibratory sensation: normal  Hot/Cold sensation: normal     Left Foot Neurologic   Normal sensation    Light touch sensation: normal  Vibratory sensation: normal  Hot/Cold sensation:  normal    MUSCULOSKELETAL     Right Foot Musculoskeletal   Tenderness:  toe 1 tenderness       Left Foot Musculoskeletal   Tenderness:  toe 1 tenderness    MUSCLE STRENGTH     Right Foot Muscle Strength   Foot dorsiflexion:  4  Foot plantar flexion:  4  Foot inversion:  4  Foot eversion:  4     Left Foot Muscle Strength   Foot dorsiflexion:  4  Foot plantar flexion:  4  Foot inversion:  4  Foot eversion:  4    RANGE OF MOTION     Right Foot Range of Motion   Foot and ankle ROM within normal limits       Left Foot Range of Motion   Foot and ankle ROM within normal limits      DERMATOLOGIC      Right Foot Dermatologic   Skin  Right foot skin is intact.   Nails  1.  Positive for ingrown toenail and paronychia. (Medial and lateral borders)  Nails comment:  Right first bilateral nail borders     Left Foot Dermatologic   Skin  Left foot skin is intact.   Nails comment:  Left 1st bilateral nail borders  Nails  1.  Positive for ingrown toenail and paronychia. (Medial and lateral borders)      RADIOLOGY:          No results found.    ASSESSMENT/PLAN     Diagnoses and all orders for this visit:    1. Ingrown toenail (Primary)    2. Pain of foot, unspecified laterality        Comprehensive lower extremity examination and evaluation was performed.    Phenol and Alcohol Chemical Matrixectomy Procedure - This procedure is indicated for onychocryptosis of the bilateral hallux bilateral nail border(s). Indications, risks and benefits and alternative treatments have been discussed with this patient who has agreed to this procedure. The area was sterilely prepped with a povidone-iodine solution. The affected area was locally anesthetized with 3 ml, of 0.5% Marcaine plain. The offending  nail plate was completely excised.  Next 3 applications of 89% phenol were applied to the matrix area x 30 seconds followed by irrigation with copious amounts of isopropyl alcohol.  A sterile dressing was applied. The patient tolerated the procedure well.     Discussed findings and treatment plan including risks, benefits, and treatment options with patient in detail. Patient agreed with treatment plan.    Medications and allergies reviewed.  Reviewed available lab values along with other pertinent labs.  These were discussed with the patient.    An After Visit Summary was printed and given to the patient at discharge, including (if requested) any available informative/educational handouts regarding diagnosis, treatment, or medications. All questions were answered to patient/family satisfaction. Should symptoms fail to improve or worsen they agree to call or return to clinic or to go to the Emergency Department. Discussed the importance of following up with any needed screening tests/labs/specialist appointments and any requested follow-up recommended by me today. Importance of maintaining follow-up discussed and patient accepts that missed appointments can delay diagnosis and potentially lead to worsening of conditions.    Return in about 1 month (around 11/17/2024)., or sooner if acute issues arise.    This document has been electronically signed by Jameson Thurman DPM on October 21, 2024 07:45 EDT

## 2024-10-28 DIAGNOSIS — E11.65 TYPE 2 DIABETES MELLITUS WITH HYPERGLYCEMIA, WITHOUT LONG-TERM CURRENT USE OF INSULIN: ICD-10-CM

## 2024-10-28 DIAGNOSIS — K21.9 GASTROESOPHAGEAL REFLUX DISEASE, UNSPECIFIED WHETHER ESOPHAGITIS PRESENT: ICD-10-CM

## 2024-10-28 DIAGNOSIS — M54.50 CHRONIC LOW BACK PAIN, UNSPECIFIED BACK PAIN LATERALITY, UNSPECIFIED WHETHER SCIATICA PRESENT: ICD-10-CM

## 2024-10-28 DIAGNOSIS — G89.29 CHRONIC LOW BACK PAIN, UNSPECIFIED BACK PAIN LATERALITY, UNSPECIFIED WHETHER SCIATICA PRESENT: ICD-10-CM

## 2024-10-28 DIAGNOSIS — I10 PRIMARY HYPERTENSION: ICD-10-CM

## 2024-10-28 RX ORDER — METHOCARBAMOL 500 MG/1
500 TABLET, FILM COATED ORAL NIGHTLY
Qty: 90 TABLET | Refills: 1 | Status: SHIPPED | OUTPATIENT
Start: 2024-10-28

## 2024-10-28 RX ORDER — LISINOPRIL 20 MG/1
20 TABLET ORAL DAILY
Qty: 90 TABLET | Refills: 1 | Status: SHIPPED | OUTPATIENT
Start: 2024-10-28

## 2024-11-28 DIAGNOSIS — M54.50 CHRONIC LOW BACK PAIN, UNSPECIFIED BACK PAIN LATERALITY, UNSPECIFIED WHETHER SCIATICA PRESENT: ICD-10-CM

## 2024-11-28 DIAGNOSIS — G89.29 CHRONIC LOW BACK PAIN, UNSPECIFIED BACK PAIN LATERALITY, UNSPECIFIED WHETHER SCIATICA PRESENT: ICD-10-CM

## 2024-12-02 RX ORDER — MELOXICAM 15 MG/1
15 TABLET ORAL DAILY
Qty: 90 TABLET | Refills: 0 | Status: SHIPPED | OUTPATIENT
Start: 2024-12-02

## 2024-12-25 DIAGNOSIS — E11.65 TYPE 2 DIABETES MELLITUS WITH HYPERGLYCEMIA, WITHOUT LONG-TERM CURRENT USE OF INSULIN: ICD-10-CM

## 2024-12-26 RX ORDER — SEMAGLUTIDE 0.68 MG/ML
0.5 INJECTION, SOLUTION SUBCUTANEOUS WEEKLY
Qty: 3 ML | Refills: 5 | Status: SHIPPED | OUTPATIENT
Start: 2024-12-26

## 2024-12-26 RX ORDER — PIOGLITAZONE 15 MG/1
15 TABLET ORAL DAILY
Qty: 30 TABLET | Refills: 2 | Status: SHIPPED | OUTPATIENT
Start: 2024-12-26 | End: 2025-03-26

## 2025-01-12 NOTE — PROGRESS NOTES
Chief Complaint  Diabetes (Follow up, med mgt, A1c eval)    Referred By: No ref. provider found    Subjective          Patient or patient representative verbalized consent for the use of Ambient Listening during the visit with  MAKENNA Kwon for chart documentation. 1/15/2025  16:34 VERNA Sanchez presents to [facility] for diabetes medication management    History of Present Illness    History of Present Illness  The patient is a 54-year-old male who presents for a 3-month follow-up.    He reports no concerns related to his diabetes. He is currently on Jardiance 25 mg, which he tolerates well. He is also on metformin 1000 mg twice daily, with no reported issues. Additionally, he is on Ozempic 0.5 mg, which he tolerates well, although he experiences occasional nausea and upset stomach a few times a week. He manages these symptoms with Zofran, which unfortunately leads to constipation. His bowel movements are typically regular in the morning, but if he takes Zofran at night or before breakfast, he experiences difficulty passing stool within 30 to 45 minutes. He is also on pioglitazone. He reports no hypoglycemic episodes since discontinuing alogliptin. His weight fluctuates between 219 and 222 pounds. He monitors his blood glucose levels in the morning, which typically range from 111 to 150, with an average around 125 to 130. He is running low on test strips for his OneTouch Verio meter and also requires lancets. He has been ordering his medications online from [facility].    MEDICATIONS  Jardiance, metformin, Ozempic, Zofran, pioglitazone.      Visit type:  follow-up  Diabetes type:  Type 2  Current diabetes status/concerns/issues:  No concerns   Other health concerns:  Constipation  Current Diabetes symptoms:    Polyuria: No   Polydipsia: No   Polyphagia: No   Blurred vision: No   Excessive fatigue: No  Known Diabetes  complications:  Neuropathy: None; Location: N/A  Renal: Normal eGFR per current labs and Microalbuminuria - NEGATIVE  Eyes: No current eye exam available in record; Location: N/A  Amputation/Wounds: None  GI: Reflux and Indigestion  Cardiovascular: Hypertension, Hyperlipidemia, Hypercholesterolemia, and Hypertriglyceridemia  ED: Patient Reported  Other: None  Hypoglycemia:  None reported at this time  Hypoglycemia Symptoms:  No hypoglycemia at this time  Current diabetes treatment:  Jardiance 25 mg daily, metformin 1000 mg twice daily with meals, Actos 15 mg daily, Ozempic 0.5 mg weekly  Blood glucose device:  Meter  Blood glucose monitoring frequency:  1  Blood glucose range/average:   110-150 mg/dL  Glucose Source: Patient Reported  Diet:  Limits high carb/sweet foods, Avoids sugary drinks, Number of meals each day - 3; Number of snacks each day - occasional-fruit  Activity/Exercise:   Intentional walking 2-3 times per week    Past Medical History:   Diagnosis Date    Diabetes mellitus 2012    Erectile dysfunction     Fatty liver     HL (hearing loss)     Hyperlipidemia     Hypertension     Low back pain      Past Surgical History:   Procedure Laterality Date    LUMBAR DISCECTOMY      10/2019    SHOULDER ARTHROSCOPY W/ LABRAL REPAIR Left 2023    VASECTOMY       Family History   Problem Relation Age of Onset    Stroke Mother     Diabetes Father     Prostate cancer Father     Cancer Father     Prostate cancer Brother     Diabetes Brother     Cancer Brother     Colon cancer Neg Hx      Social History     Socioeconomic History    Marital status:    Tobacco Use    Smoking status: Former     Current packs/day: 0.00     Average packs/day: 1.5 packs/day for 5.3 years (8.0 ttl pk-yrs)     Types: Cigarettes     Start date: 1987     Quit date: 1993     Years since quittin.6     Passive exposure: Never    Smokeless tobacco: Never   Vaping Use    Vaping status: Never Used    Substance and Sexual Activity    Alcohol use: Yes     Alcohol/week: 2.0 standard drinks of alcohol     Types: 2 Cans of beer per week     Comment: occasionally    Drug use: Yes     Types: Marijuana     Comment: hasn't smoked    Sexual activity: Yes     Partners: Female     Birth control/protection: Tubal ligation, Vasectomy     Allergies   Allergen Reactions    Statins Other (See Comments)     Muscle cramps       Current Outpatient Medications:     empagliflozin (Jardiance) 25 MG tablet tablet, Take 1 tablet by mouth Daily., Disp: 90 tablet, Rfl: 1    cetirizine (zyrTEC) 10 MG tablet, Take 1 tablet by mouth Daily., Disp: , Rfl:     fluticasone (FLONASE) 50 MCG/ACT nasal spray, Administer 2 sprays into the nostril(s) as directed by provider Daily., Disp: , Rfl:     glucose blood test strip, Test blood glucose 1 times each day, Disp: 100 each, Rfl: 1    Lancets misc, Test blood glucose 1 times each day, Disp: 100 each, Rfl: 1    lisinopril (PRINIVIL,ZESTRIL) 20 MG tablet, Take 1 tablet by mouth Daily., Disp: 90 tablet, Rfl: 1    meloxicam (MOBIC) 15 MG tablet, Take 1 tablet by mouth Daily., Disp: 90 tablet, Rfl: 0    metFORMIN (GLUCOPHAGE) 1000 MG tablet, Take 1 tablet by mouth 2 (Two) Times a Day With Meals., Disp: 180 tablet, Rfl: 1    methocarbamol (ROBAXIN) 500 MG tablet, Take 1 tablet by mouth Every Night., Disp: 90 tablet, Rfl: 1    Omega-3 Fatty Acids (fish oil) 1000 MG capsule capsule, Take 1 capsule by mouth Daily With Breakfast., Disp: , Rfl:     omeprazole (priLOSEC) 20 MG capsule, Take 1 capsule by mouth Daily., Disp: 90 capsule, Rfl: 1    ondansetron (Zofran) 4 MG tablet, Take 1 tablet by mouth Every 8 (Eight) Hours As Needed for Nausea., Disp: 30 tablet, Rfl: 5    pioglitazone (Actos) 15 MG tablet, Take 1 tablet by mouth Daily, Disp: 30 tablet, Rfl: 2    Semaglutide,0.25 or 0.5MG/DOS, (Ozempic, 0.25 or 0.5 MG/DOSE,) 2 MG/3ML solution pen-injector, Inject 0.5 mg under the skin into the appropriate area  "as directed 1 (One) Time Per Week., Disp: 3 mL, Rfl: 5    sildenafil (VIAGRA) 100 MG tablet, Take 1 tablet by mouth Daily As Needed for Erectile Dysfunction., Disp: 30 tablet, Rfl: 5    Objective     Vitals:    01/14/25 1619   BP: 114/80   BP Location: Right arm   Patient Position: Sitting   Cuff Size: Large Adult   Pulse: 86   SpO2: 97%   Weight: 103 kg (227 lb 4.8 oz)   Height: 188 cm (74.02\")     Body mass index is 29.17 kg/m².    Physical Exam  Constitutional:       Appearance: Normal appearance.      Comments: Overweight (BMI 25 - 29.9) Pt Current BMI = 29.17      HENT:      Head: Normocephalic and atraumatic.      Right Ear: External ear normal.      Left Ear: External ear normal.      Nose: Nose normal.   Eyes:      Extraocular Movements: Extraocular movements intact.      Conjunctiva/sclera: Conjunctivae normal.   Pulmonary:      Effort: Pulmonary effort is normal.   Musculoskeletal:         General: Normal range of motion.      Cervical back: Normal range of motion.   Skin:     General: Skin is warm and dry.   Neurological:      General: No focal deficit present.      Mental Status: He is alert and oriented to person, place, and time. Mental status is at baseline.   Psychiatric:         Mood and Affect: Mood normal.         Behavior: Behavior normal.         Thought Content: Thought content normal.         Judgment: Judgment normal.         Result Review :   The following data was reviewed by: MAKENNA Kwon on 01/14/2025:    Most Recent A1C          1/14/2025    16:22   HGBA1C Most Recent   Hemoglobin A1C 5.9        A1C Last 3 Results          9/16/2024    07:10 10/8/2024    15:48 1/14/2025    16:22   HGBA1C Last 3 Results   Hemoglobin A1C 7.30  6.6  5.9      A1c collected in the office today is 5.9%, indicating Controlled Type II diabetes.  This result is down from the prior result of 6.6% collected on 10/8/2024    Creatinine   Date Value Ref Range Status   09/16/2024 0.93 0.76 - 1.27 mg/dL Final "   06/05/2024 0.88 0.76 - 1.27 mg/dL Final     eGFR   Date Value Ref Range Status   09/16/2024 96.4 >60.0 mL/min/1.73 Final   06/05/2024 101.6 >60.0 mL/min/1.73 Final     Labs collected on 9/16/2024 show Normal values    Microalbumin, Urine   Date Value Ref Range Status   09/16/2024 <1.2 mg/dL Final     Creatinine, Urine   Date Value Ref Range Status   09/16/2024 122.8 mg/dL Final     Microalbumin/Creatinine Ratio   Date Value Ref Range Status   09/16/2024   Final     Comment:     Unable to calculate     Urine microalbuminuria collected on 9/16/2024 is negative for microalbuminuria    Total Cholesterol   Date Value Ref Range Status   09/16/2024 209 (H) 0 - 200 mg/dL Final   06/05/2024 199 0 - 200 mg/dL Final     Triglycerides   Date Value Ref Range Status   09/16/2024 353 (H) 0 - 150 mg/dL Final   06/05/2024 487 (H) 0 - 150 mg/dL Final     HDL Cholesterol   Date Value Ref Range Status   09/16/2024 31 (L) 40 - 60 mg/dL Final   06/05/2024 33 (L) 40 - 60 mg/dL Final     LDL Cholesterol    Date Value Ref Range Status   09/16/2024 116 (H) 0 - 100 mg/dL Final   06/05/2024 87 0 - 100 mg/dL Final     Lipid panel collected on 9/16/2024 shows Hyperlipidemia, Hypercholesterolemia, Hypertriglyceridemia, and low HDL            Diagnoses and all orders for this visit:    1. Type 2 diabetes mellitus with hyperglycemia, without long-term current use of insulin (Primary)  -     POC Glycosylated Hemoglobin (Hb A1C)  -     glucose blood test strip; Test blood glucose 1 times each day  Dispense: 100 each; Refill: 1  -     Lancets misc; Test blood glucose 1 times each day  Dispense: 100 each; Refill: 1  -     empagliflozin (Jardiance) 25 MG tablet tablet; Take 1 tablet by mouth Daily.  Dispense: 90 tablet; Refill: 1    2. Hemoglobin A1c less than 7.0%    3. Overweight with body mass index (BMI) of 29 to 29.9 in adult        Assessment & Plan  1. Type 2 diabetes mellitus.  His A1c levels have shown a consistent decrease, indicating  effective management of his diabetes. Weight has remained stable, with a slight increase of 1 pound since the last visit. The dosage of metformin will be reduced from 1000 mg twice daily to 500 mg twice daily. Prescriptions for OneTouch Verio test strips and lancets have been provided. A six-month supply of Jardiance has also been ordered.    Follow-up  The patient will follow up on 04/22/2025.    The patient will monitor his blood glucose levels 1 time daily fasting.  If he develops problematic hyperglycemia or hypoglycemia or adverse drug reactions, he will contact the office for further instructions.        Follow Up     No follow-ups on file.    Patient was given instructions and counseling regarding his condition or for health maintenance advice. Please see specific information pulled into the AVS if appropriate.     Nakul Robbins, APRN  01/14/2025    Dictated Utilizing Dragon Dictation.  Please note that portions of this note were completed with a voice recognition program.  Part of this note may be an electronic transcription/translation of spoken language to printed text using the Dragon Dictation System.

## 2025-01-14 ENCOUNTER — OFFICE VISIT (OUTPATIENT)
Dept: DIABETES SERVICES | Facility: CLINIC | Age: 55
End: 2025-01-14
Payer: COMMERCIAL

## 2025-01-14 VITALS
HEIGHT: 74 IN | OXYGEN SATURATION: 97 % | SYSTOLIC BLOOD PRESSURE: 114 MMHG | WEIGHT: 227.3 LBS | BODY MASS INDEX: 29.17 KG/M2 | HEART RATE: 86 BPM | DIASTOLIC BLOOD PRESSURE: 80 MMHG

## 2025-01-14 DIAGNOSIS — R73.09 HEMOGLOBIN A1C LESS THAN 7.0%: ICD-10-CM

## 2025-01-14 DIAGNOSIS — E11.65 TYPE 2 DIABETES MELLITUS WITH HYPERGLYCEMIA, WITHOUT LONG-TERM CURRENT USE OF INSULIN: Primary | ICD-10-CM

## 2025-01-14 DIAGNOSIS — E66.3 OVERWEIGHT WITH BODY MASS INDEX (BMI) OF 29 TO 29.9 IN ADULT: ICD-10-CM

## 2025-01-14 LAB
EXPIRATION DATE: ABNORMAL
HBA1C MFR BLD: 5.9 % (ref 4.5–5.7)
Lab: ABNORMAL

## 2025-01-14 PROCEDURE — 99214 OFFICE O/P EST MOD 30 MIN: CPT

## 2025-01-14 RX ORDER — LANCETS 30 GAUGE
EACH MISCELLANEOUS
Qty: 100 EACH | Refills: 1 | Status: SHIPPED | OUTPATIENT
Start: 2025-01-14

## 2025-02-05 ENCOUNTER — OFFICE VISIT (OUTPATIENT)
Dept: FAMILY MEDICINE CLINIC | Age: 55
End: 2025-02-05
Payer: COMMERCIAL

## 2025-02-05 VITALS
TEMPERATURE: 98 F | HEIGHT: 74 IN | HEART RATE: 84 BPM | WEIGHT: 225.4 LBS | OXYGEN SATURATION: 100 % | DIASTOLIC BLOOD PRESSURE: 70 MMHG | SYSTOLIC BLOOD PRESSURE: 122 MMHG | BODY MASS INDEX: 28.93 KG/M2

## 2025-02-05 DIAGNOSIS — I10 PRIMARY HYPERTENSION: ICD-10-CM

## 2025-02-05 DIAGNOSIS — R79.89 ELEVATED LFTS: ICD-10-CM

## 2025-02-05 DIAGNOSIS — E78.5 HYPERLIPIDEMIA WITH TARGET LDL LESS THAN 70: ICD-10-CM

## 2025-02-05 DIAGNOSIS — M25.511 ACUTE PAIN OF RIGHT SHOULDER: Primary | ICD-10-CM

## 2025-02-05 DIAGNOSIS — E11.9 TYPE 2 DIABETES MELLITUS WITHOUT COMPLICATION, WITHOUT LONG-TERM CURRENT USE OF INSULIN: ICD-10-CM

## 2025-02-05 PROCEDURE — 99214 OFFICE O/P EST MOD 30 MIN: CPT | Performed by: INTERNAL MEDICINE

## 2025-02-05 NOTE — PROGRESS NOTES
"Chief Complaint  Establish Care (ADRIENNE from Kristie MENSAH ), Diabetes, Hypertension, and Pain (C/O Rt shoulder pain x 1 mo )    Subjective      Leo Sanchez is a 54 y.o. male who presents to [facility]     Patient Care Team:  Kristie Molina APRN as PCP - General (Nurse Practitioner)  Patient presents to [facility].  He is previously followed by MAKENNA Jung.  He has a known history of diabetes hypertension and hyperlipidemia.  He presents today with chief complaint of right shoulder pain that has been going on for 1 month.  He denies any injuries.  The pain is most severe when he is getting up in the morning and he typically has to take ibuprofen or Tylenol.  He does not do this every day.  For 30 years he worked in [employer] which was a very physical job however he denies any shoulder injury to the right.  He has not done any strenuous activity that would lead to this recently as he is retired from corrections work.  His pain is most noticeable in the deltoid area on the right associated with movement.  The pain will occur in the shoulder and radiate down to the elbow.  He denies any weakness.  Denies any neck injuries or current pain..    Objective   Vital Signs:   Vitals:    02/05/25 1338   BP: 122/70   BP Location: Right arm   Patient Position: Sitting   Cuff Size: Adult   Pulse: 84   Temp: 98 °F (36.7 °C)   TempSrc: Temporal   SpO2: 100%   Weight: 102 kg (225 lb 6.4 oz)   Height: 188 cm (74.02\")     Body mass index is 28.93 kg/m².    Wt Readings from Last 3 Encounters:   02/05/25 102 kg (225 lb 6.4 oz)   01/14/25 103 kg (227 lb 4.8 oz)   10/17/24 103 kg (226 lb)     BP Readings from Last 3 Encounters:   02/05/25 122/70   01/14/25 114/80   10/17/24 117/84       Health Maintenance   Topic Date Due    Hepatitis B (1 of 3 - 19+ 3-dose series) Never done    Pneumococcal Vaccine 0-64 (1 of 2 - PCV) Never done    ZOSTER VACCINE (1 of 2) Never done "    ANNUAL PHYSICAL  Never done    HEMOGLOBIN A1C  07/14/2025    DIABETIC FOOT EXAM  09/09/2025    LIPID PANEL  09/16/2025    DIABETIC EYE EXAM  10/15/2025    BMI FOLLOWUP  12/26/2025    COLORECTAL CANCER SCREENING  07/06/2027    TDAP/TD VACCINES (2 - Td or Tdap) 01/05/2034    HEPATITIS C SCREENING  Completed    INFLUENZA VACCINE  Completed    COVID-19 Vaccine  Discontinued    URINE MICROALBUMIN  Discontinued       Physical Exam   GEN:NAD, well nourished  HEENT:NCAT, PERRL, EOMI, OP clear, MMM  NECK:supple, no JVD, no carotid bruits  CVA:RRR s1, s2 no m/r/g  CHEST:CTA B no wheezes or rhonchi  ABD:soft, nontender, nondistended +bs  EXT:no c/c/e 2+PP B, tender to palpation of the right deltoid extending down to the elbow laterally, no rotator cuff tenderness with full range of motion intact  NEURO:CN II-XII grossly nonfocal, no evidence of asterixes or tremor  PSYCH: appropriate mood and affect  :deferred  SKIN: warm, dry, intact, no lesions or rashes    Result Review   The following data was reviewed by: Lisandro Thakkar MD on 02/05/2025:  [x]  Tests & Results  []  Hospitalization/Emergency Department/Urgent Care  []  Internal/External Consultant Notes    Procedures          ASSESSMENT/PLAN  Diagnoses and all orders for this visit:    1. Acute pain of right shoulder (Primary)  Comments:  Obtain plain film.  Suspect we will need possible CT versus IV if tendons are involved.  Orders:  -     XR Shoulder 2+ View Right; Future    2. Primary hypertension  Assessment & Plan:  Blood pressure of 122/70  Goal is less than 130/80.  Continue lisinopril 20 mg p.o. daily      3. Type 2 diabetes mellitus without complication, without long-term current use of insulin  Assessment & Plan:  Hemoglobin A1c of 7.3 in September 2024 we will recheck this  Yearly eye exams with no evidence of retinopathy  Denies any numbness and tingling of extremities or history of protein in urine  Currently maintained on Ozempic Jardiance and  metformin.  On ACE inhibitor  Has intolerance to statins    Orders:  -     Microalbumin / Creatinine Urine Ratio - Urine, Clean Catch; Future  -     Hemoglobin A1c; Future  -     Comprehensive Metabolic Panel; Future  -     CBC (No Diff); Future    4. Elevated LFTs    5. Hyperlipidemia with target LDL less than 70  -     Lipid Panel; Future                Leo Sanchez  reports that he quit smoking about 31 years ago. His smoking use included cigarettes. He started smoking about 37 years ago. He has a 8.6 pack-year smoking history. He has never been exposed to tobacco smoke. He has never used smokeless tobacco.             FOLLOW UP  5/12/2025  Patient was given instructions and counseling regarding his condition or for health maintenance advice. Please see specific information pulled into the AVS if appropriate.       Lisandro Thakkar MD  02/05/25  14:40 EST

## 2025-02-05 NOTE — ASSESSMENT & PLAN NOTE
Hemoglobin A1c of 7.3 in September 2024 we will recheck this  Yearly eye exams with no evidence of retinopathy  Denies any numbness and tingling of extremities or history of protein in urine  Currently maintained on Ozempic Jardiance and metformin.  On ACE inhibitor  Has intolerance to statins

## 2025-02-07 ENCOUNTER — OFFICE VISIT (OUTPATIENT)
Age: 55
End: 2025-02-07
Payer: COMMERCIAL

## 2025-02-07 ENCOUNTER — LAB (OUTPATIENT)
Dept: LAB | Facility: HOSPITAL | Age: 55
End: 2025-02-07
Payer: COMMERCIAL

## 2025-02-07 ENCOUNTER — HOSPITAL ENCOUNTER (OUTPATIENT)
Dept: GENERAL RADIOLOGY | Facility: HOSPITAL | Age: 55
Discharge: HOME | End: 2025-02-07
Payer: COMMERCIAL

## 2025-02-07 VITALS
OXYGEN SATURATION: 96 % | BODY MASS INDEX: 28.75 KG/M2 | WEIGHT: 224 LBS | HEART RATE: 85 BPM | HEIGHT: 74 IN | DIASTOLIC BLOOD PRESSURE: 73 MMHG | SYSTOLIC BLOOD PRESSURE: 112 MMHG

## 2025-02-07 DIAGNOSIS — E78.5 HYPERLIPIDEMIA WITH TARGET LDL LESS THAN 70: ICD-10-CM

## 2025-02-07 DIAGNOSIS — E11.9 TYPE 2 DIABETES MELLITUS WITHOUT COMPLICATION, WITHOUT LONG-TERM CURRENT USE OF INSULIN: ICD-10-CM

## 2025-02-07 DIAGNOSIS — M25.511 ACUTE PAIN OF RIGHT SHOULDER: ICD-10-CM

## 2025-02-07 DIAGNOSIS — K76.0 FATTY LIVER: ICD-10-CM

## 2025-02-07 DIAGNOSIS — K76.0 FATTY LIVER: Primary | ICD-10-CM

## 2025-02-07 LAB
ALBUMIN SERPL-MCNC: 4.7 G/DL (ref 3.5–5.2)
ALBUMIN UR-MCNC: <1.2 MG/DL
ALBUMIN/GLOB SERPL: 1.7 G/DL
ALP SERPL-CCNC: 84 U/L (ref 39–117)
ALT SERPL W P-5'-P-CCNC: 75 U/L (ref 1–41)
ANION GAP SERPL CALCULATED.3IONS-SCNC: 10 MMOL/L (ref 5–15)
AST SERPL-CCNC: 40 U/L (ref 1–40)
BILIRUB CONJ SERPL-MCNC: 0.3 MG/DL (ref 0–0.3)
BILIRUB SERPL-MCNC: 0.9 MG/DL (ref 0–1.2)
BUN SERPL-MCNC: 13 MG/DL (ref 6–20)
BUN/CREAT SERPL: 11.7 (ref 7–25)
CALCIUM SPEC-SCNC: 9.8 MG/DL (ref 8.6–10.5)
CHLORIDE SERPL-SCNC: 103 MMOL/L (ref 98–107)
CHOLEST SERPL-MCNC: 235 MG/DL (ref 0–200)
CO2 SERPL-SCNC: 25 MMOL/L (ref 22–29)
CREAT SERPL-MCNC: 1.11 MG/DL (ref 0.76–1.27)
CREAT UR-MCNC: 99.7 MG/DL
DEPRECATED RDW RBC AUTO: 44.5 FL (ref 37–54)
EGFRCR SERPLBLD CKD-EPI 2021: 77.9 ML/MIN/1.73
ERYTHROCYTE [DISTWIDTH] IN BLOOD BY AUTOMATED COUNT: 12.9 % (ref 12.3–15.4)
GLOBULIN UR ELPH-MCNC: 2.7 GM/DL
GLUCOSE SERPL-MCNC: 109 MG/DL (ref 65–99)
HBA1C MFR BLD: 6.2 % (ref 4.8–5.6)
HCT VFR BLD AUTO: 49.8 % (ref 37.5–51)
HDLC SERPL-MCNC: 37 MG/DL (ref 40–60)
HGB BLD-MCNC: 17.3 G/DL (ref 13–17.7)
INR PPP: 1.01 (ref 0.86–1.15)
LDLC SERPL CALC-MCNC: 120 MG/DL (ref 0–100)
LDLC/HDLC SERPL: 2.98 {RATIO}
MCH RBC QN AUTO: 32 PG (ref 26.6–33)
MCHC RBC AUTO-ENTMCNC: 34.7 G/DL (ref 31.5–35.7)
MCV RBC AUTO: 92.2 FL (ref 79–97)
MICROALBUMIN/CREAT UR: NORMAL MG/G{CREAT}
PLATELET # BLD AUTO: 275 10*3/MM3 (ref 140–450)
PMV BLD AUTO: 10.2 FL (ref 6–12)
POTASSIUM SERPL-SCNC: 4.3 MMOL/L (ref 3.5–5.2)
PROT SERPL-MCNC: 7.4 G/DL (ref 6–8.5)
PROTHROMBIN TIME: 13.7 SECONDS (ref 11.8–14.9)
RBC # BLD AUTO: 5.4 10*6/MM3 (ref 4.14–5.8)
SODIUM SERPL-SCNC: 138 MMOL/L (ref 136–145)
TRIGL SERPL-MCNC: 439 MG/DL (ref 0–150)
VLDLC SERPL-MCNC: 78 MG/DL (ref 5–40)
WBC NRBC COR # BLD AUTO: 8.45 10*3/MM3 (ref 3.4–10.8)

## 2025-02-07 PROCEDURE — 80061 LIPID PANEL: CPT

## 2025-02-07 PROCEDURE — 85027 COMPLETE CBC AUTOMATED: CPT

## 2025-02-07 PROCEDURE — 73030 X-RAY EXAM OF SHOULDER: CPT

## 2025-02-07 PROCEDURE — 99214 OFFICE O/P EST MOD 30 MIN: CPT

## 2025-02-07 PROCEDURE — 82248 BILIRUBIN DIRECT: CPT

## 2025-02-07 PROCEDURE — 83036 HEMOGLOBIN GLYCOSYLATED A1C: CPT

## 2025-02-07 PROCEDURE — 82570 ASSAY OF URINE CREATININE: CPT

## 2025-02-07 PROCEDURE — 36415 COLL VENOUS BLD VENIPUNCTURE: CPT

## 2025-02-07 PROCEDURE — 80053 COMPREHEN METABOLIC PANEL: CPT

## 2025-02-07 PROCEDURE — 82043 UR ALBUMIN QUANTITATIVE: CPT

## 2025-02-07 PROCEDURE — 85610 PROTHROMBIN TIME: CPT

## 2025-02-07 NOTE — PROGRESS NOTES
Chief Complaint     Follow-up (Elevated LFTs , elevated bilirubin)    Patient or patient representative verbalized consent for the use of Ambient Listening during the visit with  MAKENNA Ng for chart documentation. 2/9/2025  13:39 EST      History of Present Illness     Leo Sanchez is a 54 y.o. male who presents to [facility] for follow-up of hepatic steatosis.    History of Present Illness  The patient is a 54-year-old male who presents for evaluation of elevated liver enzymes and hypercholesterolemia.    He was last seen 6 months ago and was referred to pulmonary because his alpha-1 antitrypsin level was low. [facility] which [facility] not reveal any significant abnormalities. He reports no right upper quadrant pain, itching, or yellowing of the skin. His alcohol consumption is minimal, limited to 2 or 3 drinks per month.    He has elevated cholesterol levels but is not currently on any statin therapy due to a previous adverse reaction to CRESTOR 5 mg, which resulted in severe leg cramps and muscle damage. A total CK test revealed a level exceeding 5000, the highest his doctor had ever observed. He is currently taking fish oil 1000 mg daily and Metamucil every morning.    SOCIAL HISTORY  He has 2 or 3 drinks a month.    ALLERGIES  The patient has an intolerance to statins, specifically CRESTOR, due to severe leg cramps and muscle damage.    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 This is the most common form of heterozygous alpha 1 antitrypsin carrier. This level is not low enough to cause any pulmonary disease and a remote former cigarette smoker nor does he have any pulmonary symptoms of this. This level is also not low enough to cause any liver damage or cirrhosis.      8/2/2024 Initial office visit: Patient states the first time he experienced elevated liver function tests was when he tried to take statins for hyperlipidemia. He recently had started to take statins once again but experienced severe body cramping. Patient states during this time his creatinine kinase was greater than 5,000. He explains once he discontinues the statins his LFTs will usually improve. Since he cannot take statins, he started taking fish oils.      Patient has a long history of Back problems and back pain for which he takes meloxicam everyday. He notes that since March 1st, he has lost 20 lbs, through diet and the start of Ozempic. He reports elevated blood sugar levels, for which his PCP started him on Jardiance, one week ago. He denies nausea, vomiting, or dysphagia. States his heartburn is controlled with omeprazole. Reports occasional use of alcohol, one beer, twice a month.     Denies RUQ pain, IV drug use, unprofessional tattoos, history of or exposure to hepatitis, history of blood transfusions, and family history of liver disease.      Patient has a denies family history of colon cancer or colon polyps. No history of EGD/Colonoscopy, but Cologuard July 2024 negative. Hepatic workup and liver ultrasound        History      Past Medical History:   Diagnosis Date    Diabetes mellitus 11/28/2012    Erectile dysfunction 2020    Fatty liver     HL (hearing loss) 2020    Hyperlipidemia     Hypertension     Low back pain      Past Surgical History:   Procedure Laterality Date    LUMBAR DISCECTOMY      10/2019    SHOULDER ARTHROSCOPY W/ LABRAL REPAIR Left 08/29/2023    VASECTOMY  2000     Family History   Problem Relation Age of Onset    Stroke Mother     Diabetes Father     Prostate cancer Father     Cancer Father     Prostate cancer Brother     Diabetes Brother     Cancer Brother     Colon cancer Neg Hx         Current Medications       Current Outpatient Medications:     cetirizine (zyrTEC) 10 MG  tablet, Take 1 tablet by mouth Daily., Disp: , Rfl:     empagliflozin (Jardiance) 25 MG tablet tablet, Take 1 tablet by mouth Daily., Disp: 90 tablet, Rfl: 1    fluticasone (FLONASE) 50 MCG/ACT nasal spray, Administer 2 sprays into the nostril(s) as directed by provider Daily., Disp: , Rfl:     glucose blood test strip, Test blood glucose Daily, Disp: 100 each, Rfl: 1    Lancets misc, Test blood glucose 1 times each day, Disp: 100 each, Rfl: 1    lisinopril (PRINIVIL,ZESTRIL) 20 MG tablet, Take 1 tablet by mouth Daily., Disp: 90 tablet, Rfl: 1    meloxicam (MOBIC) 15 MG tablet, Take 1 tablet by mouth Daily., Disp: 90 tablet, Rfl: 0    metFORMIN (GLUCOPHAGE) 500 MG tablet, Take 1 tablet by mouth 2 (Two) Times a Day With Meals., Disp: , Rfl:     methocarbamol (ROBAXIN) 500 MG tablet, Take 1 tablet by mouth Every Night., Disp: 90 tablet, Rfl: 1    Omega-3 Fatty Acids (fish oil) 1000 MG capsule capsule, Take 1 capsule by mouth Daily With Breakfast., Disp: , Rfl:     omeprazole (priLOSEC) 20 MG capsule, Take 1 capsule by mouth Daily., Disp: 90 capsule, Rfl: 1    ondansetron (Zofran) 4 MG tablet, Take 1 tablet by mouth Every 8 (Eight) Hours As Needed for Nausea., Disp: 30 tablet, Rfl: 5    pioglitazone (Actos) 15 MG tablet, Take 1 tablet by mouth Daily, Disp: 30 tablet, Rfl: 2    Semaglutide,0.25 or 0.5MG/DOS, (Ozempic, 0.25 or 0.5 MG/DOSE,) 2 MG/3ML solution pen-injector, Inject 0.5 mg under the skin into the appropriate area as directed 1 (One) Time Per Week., Disp: 3 mL, Rfl: 5    sildenafil (VIAGRA) 100 MG tablet, Take 1 tablet by mouth Daily As Needed for Erectile Dysfunction., Disp: 30 tablet, Rfl: 5     Allergies     Allergies   Allergen Reactions    Statins Other (See Comments)     Muscle cramps       Social History       Social History     Social History Narrative    , 1 child. Probation and parole         Objective       /73 (BP Location: Left arm, Patient Position: Sitting, Cuff Size: Adult)    "Pulse 85   Ht 188 cm (74.02\")   Wt 102 kg (224 lb)   SpO2 96%   BMI 28.75 kg/m²       Physical Exam  HENT:      Head: Normocephalic.   Cardiovascular:      Rate and Rhythm: Normal rate.   Musculoskeletal:         General: Normal range of motion.   Neurological:      General: No focal deficit present.      Mental Status: He is alert.               Results       Result Review :    The following data was reviewed by: MAKENNA Ng on 02/07/2025:    Lab Results - Last 18 Months   Lab Units 02/07/25  1547 09/16/24  0710 08/02/24  1605   WBC 10*3/mm3 8.45 6.83 7.98   HEMOGLOBIN g/dL 17.3 16.0 17.5   MCV fL 92.2 94.7 91.2   PLATELETS 10*3/mm3 275 233 264         Lab Results - Last 18 Months   Lab Units 02/07/25  1547 09/16/24  0710 06/05/24  0716   BUN mg/dL 13 15 17   CREATININE mg/dL 1.11 0.93 0.88   SODIUM mmol/L 138 136 137   POTASSIUM mmol/L 4.3 4.4 4.6   CHLORIDE mmol/L 103 102 100   CO2 mmol/L 25.0 24.4 25.0   GLUCOSE mg/dL 109* 125* 194*      Lab Results - Last 18 Months   Lab Units 02/07/25  1547 08/02/24  1605   PROTIME Seconds 13.7 14.0   INR  1.01 1.06     Lab Results - Last 18 Months   Lab Units 02/07/25  1547 09/16/24  0710 08/02/24  1605   AST (SGOT) U/L 40 42* 47*   ALT (SGPT) U/L 75* 88* 77*   ALK PHOS U/L 84 71 114   BILIRUBIN mg/dL 0.9 0.7 0.5   BILIRUBIN DIRECT mg/dL 0.3  --  <0.2   TOTAL PROTEIN g/dL 7.4 6.9 7.3   ALBUMIN g/dL 4.7 4.5 4.6  4.1      Lab Results - Last 18 Months   Lab Units 08/02/24  1605   IRON mcg/dL 93   TRANSFERRIN mg/dL 265   TIBC mcg/dL 395   FERRITIN ng/mL 112.30     Lab Results - Last 18 Months   Lab Units 08/02/24  1605   HEP A IGM  Non-Reactive       No Images in the past 120 days found..    Results               Assessment and Plan              Diagnoses and all orders for this visit:    1. Fatty liver (Primary)  -     CBC (No Diff); Future  -     Hepatic Function Panel; Future  -     Protime-INR; Future  -     US Liver; Future        Assessment & Plan  1. " Elevated liver enzymes.  His alpha-1 antitrypsin level, although low, is not sufficiently reduced to induce liver damage or cirrhosis. The primary etiology of his elevated liver enzymes is likely attributable to fatty liver disease, which could be a consequence of dietary habits or genetic predisposition. He does not exhibit the typical phenotype associated with this condition. His cholesterol levels are also elevated, which may contribute to the hepatic steatosis. He has been advised to abstain from alcohol consumption and to reduce intake of sugars, carbohydrates, and greasy foods. A comprehensive panel of laboratory tests has been ordered to monitor his liver enzyme levels. An ultrasound of the liver has also been scheduled. Educational materials on fatty liver disease and dietary recommendations have been provided.    2. Hypercholesterolemia.  He is unable to take statins due to severe leg cramps and muscle damage experienced with previous statin therapy. He is currently managing his cholesterol with 1000 mg of fish oil daily and Metamucil every morning.    Follow-up  The patient will follow up in 6 months, or earlier if necessary.        * Surgery not found *    Follow Up     Follow Up   Return in about 6 months (around 8/7/2025) for Fatty Liver Disease.      Patient was given instructions and counseling regarding his condition or for health maintenance advice. Please see specific information pulled into the AVS if appropriate.

## 2025-02-11 ENCOUNTER — PATIENT MESSAGE (OUTPATIENT)
Dept: FAMILY MEDICINE CLINIC | Age: 55
End: 2025-02-11
Payer: COMMERCIAL

## 2025-02-11 DIAGNOSIS — M19.011 ARTHRITIS OF RIGHT ACROMIOCLAVICULAR JOINT: Primary | ICD-10-CM

## 2025-02-12 ENCOUNTER — PATIENT MESSAGE (OUTPATIENT)
Dept: FAMILY MEDICINE CLINIC | Age: 55
End: 2025-02-12
Payer: COMMERCIAL

## 2025-02-12 NOTE — PROGRESS NOTES
Your lipid panel has been reviewed and your lipids have all increased.  I have noted that you have an intolerance to statins.  Has anyone ever discussed alternative therapies for control of your cholesterol other than fish oil?  Have you ever been on anything for your triglycerides?  I would like to start fenofibrate to better control your triglycerides. Please let us know if you are okay with this addition to your medication regimen.  Thanks  Dr. Thakkar

## 2025-02-13 RX ORDER — EZETIMIBE 10 MG/1
10 TABLET ORAL DAILY
Qty: 30 TABLET | Refills: 3 | Status: SHIPPED | OUTPATIENT
Start: 2025-02-13

## 2025-02-13 RX ORDER — FENOFIBRATE 48 MG/1
48 TABLET, COATED ORAL DAILY
Qty: 30 TABLET | Refills: 3 | Status: SHIPPED | OUTPATIENT
Start: 2025-02-13

## 2025-03-11 ENCOUNTER — HOSPITAL ENCOUNTER (OUTPATIENT)
Dept: ULTRASOUND IMAGING | Facility: HOSPITAL | Age: 55
Discharge: HOME | End: 2025-03-11
Payer: COMMERCIAL

## 2025-03-11 DIAGNOSIS — K76.0 FATTY LIVER: ICD-10-CM

## 2025-03-11 PROCEDURE — 76705 ECHO EXAM OF ABDOMEN: CPT

## 2025-03-21 DIAGNOSIS — E11.65 TYPE 2 DIABETES MELLITUS WITH HYPERGLYCEMIA, WITHOUT LONG-TERM CURRENT USE OF INSULIN: ICD-10-CM

## 2025-03-21 RX ORDER — PIOGLITAZONE 15 MG/1
15 TABLET ORAL DAILY
Qty: 30 TABLET | Refills: 2 | Status: SHIPPED | OUTPATIENT
Start: 2025-03-21 | End: 2025-06-22

## 2025-04-14 RX ORDER — CETIRIZINE HYDROCHLORIDE 10 MG/1
10 TABLET ORAL DAILY
Qty: 90 TABLET | Refills: 3 | Status: SHIPPED | OUTPATIENT
Start: 2025-04-14

## 2025-04-20 NOTE — PROGRESS NOTES
Chief Complaint  Follow-up (Type 2 diabetes mellitus with hyperglycemia, without long-term current use of insulin)    Referred By: No ref. provider found    Subjective          Patient or patient representative verbalized consent for the use of Ambient Listening during the visit with  MAKENNA Kwon for chart documentation. 4/22/2025  16:24 EDT    Leo Sanchez presents to [facility] for diabetes medication management    History of Present Illness    History of Present Illness  The patient is a 54-year-old male who presents for a 3-month follow-up for diabetes medication management. He has no specific complaints or new health concerns, except that he will be undergoing a right bicep and tricep tendon tear repair in the near future.    A1c levels have shown some fluctuations: it was 5.9% on 01/14/2025, increased slightly to 6.2% on 02/07/2025, and is currently at 6% as measured in the office today, indicating well-controlled type 2 diabetes. A weight loss of 10 pounds has been experienced since the last visit approximately 3 months ago. Ozempic is being tolerated well without the previously experienced constipation. Current medications include Jardiance 25 mg daily, metformin 500 mg twice daily with meals, Actos 15 mg daily, and Ozempic 0.5 mg weekly.    A slight decline in EGFR was noted, from 96.4 on 09/16/2024 to 77.9 on 02/07/2025. Hydration has been encouraged.      Visit type:  follow-up  Diabetes type:  Type 2  Current diabetes status/concerns/issues:  No concerns  Other health concerns:  Right bicep-tricep tendon tear  Current Diabetes symptoms:    Polyuria: No   Polydipsia: No   Polyphagia: No   Blurred vision: No   Excessive fatigue: No  Known Diabetes complications:  Neuropathy: None; Location: N/A  Renal: Stage II mild (GFR = 60-89 mL/min) and Microalbuminuria - NEGATIVE  Eyes: No current eye exam available in record; Location: N/A  Amputation/Wounds: None  GI:  Constipation, Reflux, and Indigestion  Cardiovascular: Hyperlipidemia, Hypercholesterolemia, and Hypertriglyceridemia  ED: Patient Reported  Other: None  Hypoglycemia:  None reported at this time  Hypoglycemia Symptoms:  No hypoglycemia at this time  Current diabetes treatment:  Jardiance 25 mg daily, metformin 500 mg twice daily with meals, Actos 15 mg daily, Ozempic 0.5 mg weekly  Blood glucose device:  Meter  Blood glucose monitoring frequency:  1  Blood glucose range/average:   110-120 mg/dL  Glucose Source: Patient Reported  Diet:  Limits high carb/sweet foods, Avoids sugary drinks, Number of meals each day - 3; Number of snacks each day - occasional-fruit  Activity/Exercise:   Intentional walking 2-3 times per week    Past Medical History:   Diagnosis Date    Diabetes mellitus 2012    Erectile dysfunction     Fatty liver     HL (hearing loss)     Hyperlipidemia     Hypertension     Low back pain      Past Surgical History:   Procedure Laterality Date    LUMBAR DISCECTOMY      10/2019    SHOULDER ARTHROSCOPY W/ LABRAL REPAIR Left 2023    VASECTOMY       Family History   Problem Relation Age of Onset    Stroke Mother     Diabetes Father     Prostate cancer Father     Cancer Father     Prostate cancer Brother     Diabetes Brother     Cancer Brother     Colon cancer Neg Hx      Social History     Socioeconomic History    Marital status:    Tobacco Use    Smoking status: Former     Current packs/day: 0.00     Average packs/day: 1.5 packs/day for 5.7 years (8.6 ttl pk-yrs)     Types: Cigarettes     Start date: 1987     Quit date: 1993     Years since quittin.9     Passive exposure: Never    Smokeless tobacco: Never   Vaping Use    Vaping status: Never Used   Substance and Sexual Activity    Alcohol use: Yes     Alcohol/week: 2.0 standard drinks of alcohol     Types: 2 Cans of beer per week     Comment: occasionally    Drug use: Yes     Types: Marijuana     Comment: hasn't  smoked    Sexual activity: Yes     Partners: Female     Birth control/protection: Tubal ligation, Vasectomy     Allergies   Allergen Reactions    Statins Other (See Comments)     Muscle cramps       Current Outpatient Medications:     cetirizine (zyrTEC) 10 MG tablet, Take 1 tablet by mouth Daily., Disp: 90 tablet, Rfl: 3    empagliflozin (Jardiance) 25 MG tablet tablet, Take 1 tablet by mouth Daily., Disp: 90 tablet, Rfl: 1    ezetimibe (Zetia) 10 MG tablet, Take 1 tablet by mouth Daily., Disp: 30 tablet, Rfl: 3    fenofibrate (Tricor) 48 MG tablet, Take 1 tablet by mouth Daily., Disp: 30 tablet, Rfl: 3    fluticasone (FLONASE) 50 MCG/ACT nasal spray, Administer 2 sprays into the nostril(s) as directed by provider Daily., Disp: , Rfl:     glucose blood test strip, Test blood glucose Daily, Disp: 100 each, Rfl: 5    Lancets misc, Test blood glucose 1 times each day, Disp: 100 each, Rfl: 1    lisinopril (PRINIVIL,ZESTRIL) 20 MG tablet, Take 1 tablet by mouth Daily., Disp: 90 tablet, Rfl: 1    meloxicam (MOBIC) 15 MG tablet, Take 1 tablet by mouth Daily., Disp: 90 tablet, Rfl: 0    metFORMIN (GLUCOPHAGE) 500 MG tablet, Take 1 tablet by mouth Every Night., Disp: 90 tablet, Rfl: 1    methocarbamol (ROBAXIN) 500 MG tablet, Take 1 tablet by mouth Every Night., Disp: 90 tablet, Rfl: 1    Omega-3 Fatty Acids (fish oil) 1000 MG capsule capsule, Take 1 capsule by mouth Daily With Breakfast., Disp: , Rfl:     omeprazole (priLOSEC) 20 MG capsule, Take 1 capsule by mouth Daily., Disp: 90 capsule, Rfl: 1    ondansetron (Zofran) 4 MG tablet, Take 1 tablet by mouth Every 8 (Eight) Hours As Needed for Nausea., Disp: 30 tablet, Rfl: 5    pioglitazone (Actos) 15 MG tablet, Take 1 tablet by mouth Daily, Disp: 30 tablet, Rfl: 2    Semaglutide,0.25 or 0.5MG/DOS, (Ozempic, 0.25 or 0.5 MG/DOSE,) 2 MG/3ML solution pen-injector, Inject 0.5 mg under the skin into the appropriate area as directed 1 (One) Time Per Week., Disp: 3 mL, Rfl: 5     "sildenafil (VIAGRA) 100 MG tablet, Take 1 tablet by mouth Daily As Needed for Erectile Dysfunction., Disp: 30 tablet, Rfl: 5    Objective     Vitals:    04/22/25 1614   BP: 114/84   BP Location: Right arm   Patient Position: Sitting   Cuff Size: Large Adult   Pulse: 76   SpO2: 96%   Weight: 98.4 kg (217 lb)   Height: 188 cm (74.02\")     Body mass index is 27.85 kg/m².    Physical Exam  Constitutional:       Appearance: Normal appearance.      Comments: Overweight (BMI 25 - 29.9) Pt Current BMI = 27.85      HENT:      Head: Normocephalic and atraumatic.      Right Ear: External ear normal.      Left Ear: External ear normal.      Nose: Nose normal.   Eyes:      Extraocular Movements: Extraocular movements intact.      Conjunctiva/sclera: Conjunctivae normal.   Pulmonary:      Effort: Pulmonary effort is normal.   Musculoskeletal:         General: Normal range of motion.      Cervical back: Normal range of motion.   Skin:     General: Skin is warm and dry.   Neurological:      General: No focal deficit present.      Mental Status: He is alert and oriented to person, place, and time. Mental status is at baseline.   Psychiatric:         Mood and Affect: Mood normal.         Behavior: Behavior normal.         Thought Content: Thought content normal.         Judgment: Judgment normal.         Result Review :   The following data was reviewed by: MAKENNA Kwon on 04/22/2025:    Most Recent A1C          4/22/2025    16:21   HGBA1C Most Recent   Hemoglobin A1C 6.0        A1C Last 3 Results          1/14/2025    16:22 2/7/2025    15:47 4/22/2025    16:21   HGBA1C Last 3 Results   Hemoglobin A1C 5.9  6.20  6.0      A1c collected in the office today is 6.0%, indicating Controlled Type II diabetes.  This result is down from the prior result of 6.2% collected on 2/7/2025    Creatinine   Date Value Ref Range Status   02/07/2025 1.11 0.76 - 1.27 mg/dL Final   09/16/2024 0.93 0.76 - 1.27 mg/dL Final     eGFR   Date Value Ref " Range Status   02/07/2025 77.9 >60.0 mL/min/1.73 Final   09/16/2024 96.4 >60.0 mL/min/1.73 Final     Labs collected on 2/7/2025 show Stage II mild (GFR = 60-89mL/min)    Microalbumin, Urine   Date Value Ref Range Status   02/07/2025 <1.2 mg/dL Final   09/16/2024 <1.2 mg/dL Final     Creatinine, Urine   Date Value Ref Range Status   02/07/2025 99.7 mg/dL Final   09/16/2024 122.8 mg/dL Final     Microalbumin/Creatinine Ratio   Date Value Ref Range Status   02/07/2025   Final     Comment:     Unable to calculate   09/16/2024   Final     Comment:     Unable to calculate     Urine microalbuminuria collected on 2/7/2025 is negative for microalbuminuria    Total Cholesterol   Date Value Ref Range Status   02/07/2025 235 (H) 0 - 200 mg/dL Final   09/16/2024 209 (H) 0 - 200 mg/dL Final     Triglycerides   Date Value Ref Range Status   02/07/2025 439 (H) 0 - 150 mg/dL Final   09/16/2024 353 (H) 0 - 150 mg/dL Final     HDL Cholesterol   Date Value Ref Range Status   02/07/2025 37 (L) 40 - 60 mg/dL Final   09/16/2024 31 (L) 40 - 60 mg/dL Final     LDL Cholesterol    Date Value Ref Range Status   02/07/2025 120 (H) 0 - 100 mg/dL Final   09/16/2024 116 (H) 0 - 100 mg/dL Final     Lipid panel collected on 2/7/2025 shows Hyperlipidemia, Hypercholesterolemia, Hypertriglyceridemia, and low HDL            Diagnoses and all orders for this visit:    1. Controlled type 2 diabetes mellitus with hyperglycemia, without long-term current use of insulin (Primary)  -     POC Glycosylated Hemoglobin (Hb A1C)  -     empagliflozin (Jardiance) 25 MG tablet tablet; Take 1 tablet by mouth Daily.  Dispense: 90 tablet; Refill: 1  -     glucose blood test strip; Test blood glucose Daily  Dispense: 100 each; Refill: 5  -     metFORMIN (GLUCOPHAGE) 500 MG tablet; Take 1 tablet by mouth Every Night.  Dispense: 90 tablet; Refill: 1  -     Semaglutide,0.25 or 0.5MG/DOS, (Ozempic, 0.25 or 0.5 MG/DOSE,) 2 MG/3ML solution pen-injector; Inject 0.5 mg under the  skin into the appropriate area as directed 1 (One) Time Per Week.  Dispense: 3 mL; Refill: 5    2. Controlled type 2 diabetes mellitus with stage 2 chronic kidney disease, without long-term current use of insulin    3. Hemoglobin A1c less than 7.0%    4. Overweight with body mass index (BMI) of 27 to 27.9 in adult        Assessment & Plan  1. Type 2 diabetes mellitus: well-controlled.  - A1c levels: 5.9% on 01/14/2025, 6.2% on 02/07/2025, and 6% today.  - 10-pound weight loss since the last visit; blood pressure 114/84.  - Discussion on reducing daily medications; patient tolerating Ozempic well.  - Metformin reduced to 500 mg once daily; continue Jardiance, Actos, and Ozempic; six-month supply of medications and test strips provided.    Follow-up: 11/04/2025 at 4:20 PM.    The patient will monitor his blood glucose levels 1 time daily fasting.  If he develops problematic hyperglycemia or hypoglycemia or adverse drug reactions, he will contact the office for further instructions.        Follow Up     Return in 6 months (on 10/22/2025) for Medication Management.    Patient was given instructions and counseling regarding his condition or for health maintenance advice. Please see specific information pulled into the AVS if appropriate.     Nakul Robbins, MAKENNA  04/22/2025    Dictated Utilizing Dragon Dictation.  Please note that portions of this note were completed with a voice recognition program.  Part of this note may be an electronic transcription/translation of spoken language to printed text using the Dragon Dictation System.

## 2025-04-22 ENCOUNTER — OFFICE VISIT (OUTPATIENT)
Dept: DIABETES SERVICES | Facility: CLINIC | Age: 55
End: 2025-04-22
Payer: COMMERCIAL

## 2025-04-22 VITALS
WEIGHT: 217 LBS | HEIGHT: 74 IN | SYSTOLIC BLOOD PRESSURE: 114 MMHG | BODY MASS INDEX: 27.85 KG/M2 | DIASTOLIC BLOOD PRESSURE: 84 MMHG | HEART RATE: 76 BPM | OXYGEN SATURATION: 96 %

## 2025-04-22 DIAGNOSIS — E11.22 CONTROLLED TYPE 2 DIABETES MELLITUS WITH STAGE 2 CHRONIC KIDNEY DISEASE, WITHOUT LONG-TERM CURRENT USE OF INSULIN: ICD-10-CM

## 2025-04-22 DIAGNOSIS — E66.3 OVERWEIGHT WITH BODY MASS INDEX (BMI) OF 27 TO 27.9 IN ADULT: ICD-10-CM

## 2025-04-22 DIAGNOSIS — E11.65 CONTROLLED TYPE 2 DIABETES MELLITUS WITH HYPERGLYCEMIA, WITHOUT LONG-TERM CURRENT USE OF INSULIN: Primary | ICD-10-CM

## 2025-04-22 DIAGNOSIS — R73.09 HEMOGLOBIN A1C LESS THAN 7.0%: ICD-10-CM

## 2025-04-22 DIAGNOSIS — N18.2 CONTROLLED TYPE 2 DIABETES MELLITUS WITH STAGE 2 CHRONIC KIDNEY DISEASE, WITHOUT LONG-TERM CURRENT USE OF INSULIN: ICD-10-CM

## 2025-04-22 LAB
EXPIRATION DATE: ABNORMAL
HBA1C MFR BLD: 6 % (ref 4.5–5.7)
Lab: ABNORMAL

## 2025-04-22 PROCEDURE — 99214 OFFICE O/P EST MOD 30 MIN: CPT

## 2025-04-22 RX ORDER — SEMAGLUTIDE 0.68 MG/ML
0.5 INJECTION, SOLUTION SUBCUTANEOUS WEEKLY
Qty: 3 ML | Refills: 5 | Status: SHIPPED | OUTPATIENT
Start: 2025-04-22

## 2025-05-08 DIAGNOSIS — E11.65 TYPE 2 DIABETES MELLITUS WITH HYPERGLYCEMIA, WITHOUT LONG-TERM CURRENT USE OF INSULIN: ICD-10-CM

## 2025-05-08 RX ORDER — PIOGLITAZONE 15 MG/1
15 TABLET ORAL DAILY
Qty: 30 TABLET | Refills: 2 | Status: SHIPPED | OUTPATIENT
Start: 2025-05-08 | End: 2025-08-06

## 2025-06-06 RX ORDER — FENOFIBRATE 48 MG/1
48 TABLET, FILM COATED ORAL DAILY
Qty: 90 TABLET | Refills: 0 | Status: SHIPPED | OUTPATIENT
Start: 2025-06-06

## 2025-06-06 RX ORDER — EZETIMIBE 10 MG/1
10 TABLET ORAL DAILY
Qty: 90 TABLET | Refills: 0 | Status: SHIPPED | OUTPATIENT
Start: 2025-06-06

## 2025-06-11 DIAGNOSIS — E11.65 CONTROLLED TYPE 2 DIABETES MELLITUS WITH HYPERGLYCEMIA, WITHOUT LONG-TERM CURRENT USE OF INSULIN: Primary | ICD-10-CM

## 2025-06-11 RX ORDER — AVOBENZONE, HOMOSALATE, OCTISALATE, OCTOCRYLENE 30; 40; 45; 26 MG/ML; MG/ML; MG/ML; MG/ML
CREAM TOPICAL
Qty: 100 EACH | Refills: 5 | Status: SHIPPED | OUTPATIENT
Start: 2025-06-11

## 2025-06-11 RX ORDER — DIPHENHYDRAMINE HYDROCHLORIDE 25 MG/1
CAPSULE, LIQUID FILLED ORAL
Qty: 1 EACH | Refills: 0 | Status: SHIPPED | OUTPATIENT
Start: 2025-06-11

## 2025-07-17 ENCOUNTER — TELEPHONE (OUTPATIENT)
Age: 55
End: 2025-07-17
Payer: COMMERCIAL

## 2025-07-17 DIAGNOSIS — K76.0 FATTY LIVER: Primary | ICD-10-CM

## 2025-07-21 ENCOUNTER — LAB (OUTPATIENT)
Dept: LAB | Facility: HOSPITAL | Age: 55
End: 2025-07-21
Payer: COMMERCIAL

## 2025-07-21 DIAGNOSIS — K76.0 FATTY LIVER: ICD-10-CM

## 2025-07-21 LAB
ALBUMIN SERPL-MCNC: 4.2 G/DL (ref 3.5–5.2)
ALP SERPL-CCNC: 70 U/L (ref 39–117)
ALT SERPL W P-5'-P-CCNC: 48 U/L (ref 1–41)
AST SERPL-CCNC: 31 U/L (ref 1–40)
BILIRUB CONJ SERPL-MCNC: 0.3 MG/DL (ref 0–0.3)
BILIRUB INDIRECT SERPL-MCNC: 0.8 MG/DL
BILIRUB SERPL-MCNC: 1.1 MG/DL (ref 0–1.2)
DEPRECATED RDW RBC AUTO: 45.7 FL (ref 37–54)
ERYTHROCYTE [DISTWIDTH] IN BLOOD BY AUTOMATED COUNT: 12.9 % (ref 12.3–15.4)
HCT VFR BLD AUTO: 45.7 % (ref 37.5–51)
HGB BLD-MCNC: 15.8 G/DL (ref 13–17.7)
INR PPP: 1.06 (ref 0.86–1.15)
MCH RBC QN AUTO: 32.7 PG (ref 26.6–33)
MCHC RBC AUTO-ENTMCNC: 34.6 G/DL (ref 31.5–35.7)
MCV RBC AUTO: 94.6 FL (ref 79–97)
PLATELET # BLD AUTO: 250 10*3/MM3 (ref 140–450)
PMV BLD AUTO: 10.4 FL (ref 6–12)
PROT SERPL-MCNC: 7.2 G/DL (ref 6–8.5)
PROTHROMBIN TIME: 14.3 SECONDS (ref 11.8–14.9)
RBC # BLD AUTO: 4.83 10*6/MM3 (ref 4.14–5.8)
WBC NRBC COR # BLD AUTO: 7.71 10*3/MM3 (ref 3.4–10.8)

## 2025-07-21 PROCEDURE — 80076 HEPATIC FUNCTION PANEL: CPT

## 2025-07-21 PROCEDURE — 85027 COMPLETE CBC AUTOMATED: CPT

## 2025-07-21 PROCEDURE — 85610 PROTHROMBIN TIME: CPT

## 2025-07-21 PROCEDURE — 36415 COLL VENOUS BLD VENIPUNCTURE: CPT

## 2025-07-22 DIAGNOSIS — I10 PRIMARY HYPERTENSION: ICD-10-CM

## 2025-07-22 RX ORDER — LISINOPRIL 20 MG/1
20 TABLET ORAL DAILY
Qty: 30 TABLET | Refills: 0 | Status: SHIPPED | OUTPATIENT
Start: 2025-07-22

## 2025-07-22 NOTE — TELEPHONE ENCOUNTER
Rx Refill Note  Requested Prescriptions     Pending Prescriptions Disp Refills    lisinopril (PRINIVIL,ZESTRIL) 20 MG tablet 90 tablet 1     Sig: Take 1 tablet by mouth Daily.      Last office visit with prescribing clinician: 9/9/2024   Last telemedicine visit with prescribing clinician: Visit date not found   Next office visit with prescribing clinician: 7/25/25      Pinky Agrawal LPN  07/22/25, 12:40 EDT    LF-10/28/24 #90, RF-1    HAS NEW PT APPT 7/25/25    ON CALL FOR DAMEON

## 2025-07-25 ENCOUNTER — OFFICE VISIT (OUTPATIENT)
Dept: FAMILY MEDICINE CLINIC | Age: 55
End: 2025-07-25
Payer: COMMERCIAL

## 2025-07-25 VITALS
OXYGEN SATURATION: 97 % | BODY MASS INDEX: 27.98 KG/M2 | HEIGHT: 74 IN | TEMPERATURE: 97.8 F | DIASTOLIC BLOOD PRESSURE: 73 MMHG | SYSTOLIC BLOOD PRESSURE: 110 MMHG | WEIGHT: 218 LBS | HEART RATE: 74 BPM

## 2025-07-25 DIAGNOSIS — Z00.00 WELL ADULT EXAM: Primary | ICD-10-CM

## 2025-07-25 PROBLEM — K76.0 FATTY LIVER: Status: ACTIVE | Noted: 2025-02-25

## 2025-07-25 PROCEDURE — 99396 PREV VISIT EST AGE 40-64: CPT | Performed by: NURSE PRACTITIONER

## 2025-07-25 NOTE — PROGRESS NOTES
"Chief Complaint  Annual Exam    Subjective          Leo Sanchez presents to [facility]     Patient is a 54-year-old male who is here today for an annual physical.  [facility]  His last eye exam in March.  Gets regular dental cleanings.  Cologuard testing negative in 2024.  Next Cologuard due in 2027.  He is to report any rectal bleeding or change in his stool patterns.  He received a Pneumovax 23 in [place] in 2017 and shingles vaccine in 2017.  He may be a candidate for Prevnar 20 or 21.  Patient states [facility].  He had pneumonia as a child but no other episodes.  He follows with diabetes specialist and in April his hemoglobin A1c was 6%.  He does take zetia and fenofibrate for high cholesterol and he [facility] his total cholesterol was 212, triglycerides 281, HDL 44, and  which is improved compared to February's readings.  His PSA recently was 0.266 at [facility].  He received hepatitis B vaccine in 1993.  He denies concerns today.  He denies any digestive concerns, chest pain,  edema or difficulty starting or stopping his urinary stream.     Objective   Vital Signs:   Vitals:    07/25/25 1305   BP: 110/73   BP Location: Left arm   Patient Position: Sitting   Cuff Size: Adult   Pulse: 74   Temp: 97.8 °F (36.6 °C)   TempSrc: Oral   SpO2: 97%   Weight: 98.9 kg (218 lb)   Height: 188 cm (74\")       Wt Readings from Last 3 Encounters:   07/25/25 98.9 kg (218 lb)   04/22/25 98.4 kg (217 lb)   02/07/25 102 kg (224 lb)      BP Readings from Last 3 Encounters:   07/25/25 110/73   04/22/25 114/84   02/07/25 112/73       Body mass index is 27.99 kg/m².             Physical Exam  Vitals reviewed.   Constitutional:       General: He is not in acute distress.     Appearance: Normal appearance. He is well-developed.   HENT:      Right Ear: Tympanic membrane normal.      Left Ear: Tympanic membrane normal.   Neck:      Thyroid: No " thyromegaly.      Vascular: No carotid bruit.   Cardiovascular:      Rate and Rhythm: Normal rate and regular rhythm.      Pulses:           Posterior tibial pulses are 2+ on the right side and 2+ on the left side.      Heart sounds: Normal heart sounds.   Pulmonary:      Effort: Pulmonary effort is normal.      Breath sounds: Normal breath sounds.   Abdominal:      General: Abdomen is flat. Bowel sounds are normal. There is no distension.      Palpations: Abdomen is soft. There is no mass.      Tenderness: There is no abdominal tenderness. There is no guarding or rebound.   Musculoskeletal:      Right lower leg: No edema.      Left lower leg: No edema.   Skin:     General: Skin is warm and dry.   Neurological:      General: No focal deficit present.      Mental Status: He is alert.   Psychiatric:         Attention and Perception: Attention normal.         Mood and Affect: Mood and affect normal.         Behavior: Behavior normal.           Current Outpatient Medications:     Blood Glucose Monitoring Suppl (Blood Glucose Monitor System) w/Device kit, Use as directed for blood glucose monitoring, Disp: 1 each, Rfl: 0    cetirizine (zyrTEC) 10 MG tablet, Take 1 tablet by mouth Daily., Disp: 90 tablet, Rfl: 3    empagliflozin (Jardiance) 25 MG tablet tablet, Take 1 tablet by mouth Daily., Disp: 90 tablet, Rfl: 1    ezetimibe (Zetia) 10 MG tablet, Take 1 tablet by mouth Daily., Disp: 90 tablet, Rfl: 0    fenofibrate (Tricor) 48 MG tablet, Take 1 tablet by mouth Daily., Disp: 90 tablet, Rfl: 0    fluticasone (FLONASE) 50 MCG/ACT nasal spray, Administer 2 sprays into the nostril(s) as directed by provider Daily., Disp: , Rfl:     glucose blood test strip, Use to test blood glucose up to 3 times per day, Disp: 100 each, Rfl: 5    Lancets misc, Use to test blood glucose up to 3 times per day, Disp: 100 each, Rfl: 5    lisinopril (PRINIVIL,ZESTRIL) 20 MG tablet, Take 1 tablet by mouth Daily., Disp: 30 tablet, Rfl: 0     meloxicam (MOBIC) 15 MG tablet, Take 1 tablet by mouth Daily., Disp: 90 tablet, Rfl: 0    metFORMIN (GLUCOPHAGE) 500 MG tablet, Take 1 tablet by mouth Every Night., Disp: 90 tablet, Rfl: 1    methocarbamol (ROBAXIN) 500 MG tablet, Take 1 tablet by mouth Every Night., Disp: 90 tablet, Rfl: 1    Omega-3 Fatty Acids (fish oil) 1000 MG capsule capsule, Take 1 capsule by mouth Daily With Breakfast., Disp: , Rfl:     omeprazole (priLOSEC) 20 MG capsule, Take 1 capsule by mouth Daily., Disp: 90 capsule, Rfl: 1    ondansetron (Zofran) 4 MG tablet, Take 1 tablet by mouth Every 8 (Eight) Hours As Needed for Nausea., Disp: 30 tablet, Rfl: 5    pioglitazone (Actos) 15 MG tablet, Take 1 tablet by mouth Daily, Disp: 30 tablet, Rfl: 2    Semaglutide,0.25 or 0.5MG/DOS, (Ozempic, 0.25 or 0.5 MG/DOSE,) 2 MG/3ML solution pen-injector, Inject 0.5 mg under the skin into the appropriate area as directed 1 (One) Time Per Week., Disp: 3 mL, Rfl: 5    sildenafil (VIAGRA) 100 MG tablet, Take 1 tablet by mouth Daily As Needed for Erectile Dysfunction., Disp: 30 tablet, Rfl: 5   Past Medical History:   Diagnosis Date    Diabetes mellitus 11/28/2012    Erectile dysfunction 2020    Fatty liver     High arches     HL (hearing loss) 2020    Hyperlipidemia     Hypertension     Ingrown toenail     Liver disease     Low back pain     Type 2 diabetes mellitus 11/2012     Allergies   Allergen Reactions    Statins Other (See Comments)     Muscle cramps               Result Review :     Common labs          2/7/2025    15:47 4/22/2025    16:21 7/21/2025    07:07   Common Labs   Glucose 109      BUN 13      Creatinine 1.11      Sodium 138      Potassium 4.3      Chloride 103      Calcium 9.8      Albumin 4.7   4.2    Total Bilirubin 0.9   1.1    Alkaline Phosphatase 84   70    AST (SGOT) 40   31    ALT (SGPT) 75   48    WBC 8.45   7.71    Hemoglobin 17.3   15.8    Hematocrit 49.8   45.7    Platelets 275   250    Total Cholesterol 235      Triglycerides  439      HDL Cholesterol 37      LDL Cholesterol  120      Hemoglobin A1C 6.20  6.0     Microalbumin, Urine <1.2           No Images in the past 120 days found..           Social History     Tobacco Use   Smoking Status Former    Current packs/day: 0.00    Average packs/day: 1.5 packs/day for 5.7 years (8.6 ttl pk-yrs)    Types: Cigarettes    Start date: 1987    Quit date: 1993    Years since quittin.1    Passive exposure: Never   Smokeless Tobacco Never           Assessment and Plan    There are no diagnoses linked to this encounter.    Follow Up    Return in about 1 year (around 2026) for Annual physical.  Patient was given instructions and counseling regarding his condition or for health maintenance advice. Please see specific information pulled into the AVS if appropriate.

## 2025-08-07 DIAGNOSIS — E11.65 CONTROLLED TYPE 2 DIABETES MELLITUS WITH HYPERGLYCEMIA, WITHOUT LONG-TERM CURRENT USE OF INSULIN: ICD-10-CM

## 2025-08-07 DIAGNOSIS — I10 PRIMARY HYPERTENSION: ICD-10-CM

## 2025-08-07 RX ORDER — LISINOPRIL 20 MG/1
20 TABLET ORAL DAILY
Qty: 30 TABLET | Refills: 0 | Status: CANCELLED | OUTPATIENT
Start: 2025-08-07

## 2025-08-08 DIAGNOSIS — K21.9 GASTROESOPHAGEAL REFLUX DISEASE, UNSPECIFIED WHETHER ESOPHAGITIS PRESENT: ICD-10-CM

## 2025-08-08 DIAGNOSIS — G89.29 CHRONIC LOW BACK PAIN, UNSPECIFIED BACK PAIN LATERALITY, UNSPECIFIED WHETHER SCIATICA PRESENT: ICD-10-CM

## 2025-08-08 DIAGNOSIS — N52.9 ERECTILE DYSFUNCTION, UNSPECIFIED ERECTILE DYSFUNCTION TYPE: ICD-10-CM

## 2025-08-08 DIAGNOSIS — M54.50 CHRONIC LOW BACK PAIN, UNSPECIFIED BACK PAIN LATERALITY, UNSPECIFIED WHETHER SCIATICA PRESENT: ICD-10-CM

## 2025-08-08 DIAGNOSIS — I10 PRIMARY HYPERTENSION: ICD-10-CM

## 2025-08-08 RX ORDER — METHOCARBAMOL 500 MG/1
500 TABLET, FILM COATED ORAL NIGHTLY
Qty: 90 TABLET | Refills: 1 | Status: CANCELLED | OUTPATIENT
Start: 2025-08-07

## 2025-08-08 RX ORDER — MELOXICAM 15 MG/1
15 TABLET ORAL DAILY
Qty: 90 TABLET | Refills: 0 | Status: CANCELLED | OUTPATIENT
Start: 2025-08-07

## 2025-08-08 RX ORDER — LISINOPRIL 20 MG/1
20 TABLET ORAL DAILY
Qty: 90 TABLET | Refills: 1 | Status: SHIPPED | OUTPATIENT
Start: 2025-08-08

## 2025-08-08 RX ORDER — SILDENAFIL 100 MG/1
100 TABLET, FILM COATED ORAL DAILY PRN
Qty: 30 TABLET | Refills: 5 | Status: CANCELLED | OUTPATIENT
Start: 2025-08-07

## 2025-08-08 RX ORDER — OMEPRAZOLE 20 MG/1
20 CAPSULE, DELAYED RELEASE ORAL DAILY
Qty: 90 CAPSULE | Refills: 1 | Status: CANCELLED | OUTPATIENT
Start: 2025-08-07

## 2025-08-11 DIAGNOSIS — G89.29 CHRONIC LOW BACK PAIN, UNSPECIFIED BACK PAIN LATERALITY, UNSPECIFIED WHETHER SCIATICA PRESENT: ICD-10-CM

## 2025-08-11 DIAGNOSIS — M54.50 CHRONIC LOW BACK PAIN, UNSPECIFIED BACK PAIN LATERALITY, UNSPECIFIED WHETHER SCIATICA PRESENT: ICD-10-CM

## 2025-08-11 RX ORDER — MELOXICAM 15 MG/1
15 TABLET ORAL DAILY
Qty: 90 TABLET | Refills: 0 | Status: SHIPPED | OUTPATIENT
Start: 2025-08-11

## 2025-08-20 DIAGNOSIS — G89.29 CHRONIC LOW BACK PAIN, UNSPECIFIED BACK PAIN LATERALITY, UNSPECIFIED WHETHER SCIATICA PRESENT: ICD-10-CM

## 2025-08-20 DIAGNOSIS — K21.9 GASTROESOPHAGEAL REFLUX DISEASE, UNSPECIFIED WHETHER ESOPHAGITIS PRESENT: ICD-10-CM

## 2025-08-20 DIAGNOSIS — M54.50 CHRONIC LOW BACK PAIN, UNSPECIFIED BACK PAIN LATERALITY, UNSPECIFIED WHETHER SCIATICA PRESENT: ICD-10-CM

## 2025-08-20 DIAGNOSIS — N52.9 ERECTILE DYSFUNCTION, UNSPECIFIED ERECTILE DYSFUNCTION TYPE: ICD-10-CM

## 2025-08-22 RX ORDER — METHOCARBAMOL 500 MG/1
500 TABLET, FILM COATED ORAL NIGHTLY
Qty: 90 TABLET | Refills: 0 | Status: SHIPPED | OUTPATIENT
Start: 2025-08-22

## 2025-08-22 RX ORDER — FENOFIBRATE 48 MG/1
48 TABLET, FILM COATED ORAL DAILY
Qty: 90 TABLET | Refills: 0 | Status: SHIPPED | OUTPATIENT
Start: 2025-08-22

## 2025-08-22 RX ORDER — EZETIMIBE 10 MG/1
10 TABLET ORAL DAILY
Qty: 90 TABLET | Refills: 0 | Status: SHIPPED | OUTPATIENT
Start: 2025-08-22

## 2025-08-22 RX ORDER — SILDENAFIL 100 MG/1
50-100 TABLET, FILM COATED ORAL DAILY PRN
Qty: 30 TABLET | Refills: 0 | Status: SHIPPED | OUTPATIENT
Start: 2025-08-22

## 2025-08-22 RX ORDER — OMEPRAZOLE 20 MG/1
20 CAPSULE, DELAYED RELEASE ORAL DAILY
Qty: 90 CAPSULE | Refills: 0 | Status: SHIPPED | OUTPATIENT
Start: 2025-08-22